# Patient Record
Sex: MALE | Race: AMERICAN INDIAN OR ALASKA NATIVE
[De-identification: names, ages, dates, MRNs, and addresses within clinical notes are randomized per-mention and may not be internally consistent; named-entity substitution may affect disease eponyms.]

---

## 2021-03-23 ENCOUNTER — HOSPITAL ENCOUNTER (INPATIENT)
Dept: HOSPITAL 5 - ED | Age: 51
LOS: 8 days | Discharge: NURSING, REHAB OR LONG-TERM CARE | DRG: 71 | End: 2021-03-31
Attending: INTERNAL MEDICINE | Admitting: INTERNAL MEDICINE
Payer: COMMERCIAL

## 2021-03-23 DIAGNOSIS — G93.41: Primary | ICD-10-CM

## 2021-03-23 DIAGNOSIS — Z79.82: ICD-10-CM

## 2021-03-23 DIAGNOSIS — I69.951: ICD-10-CM

## 2021-03-23 DIAGNOSIS — Z87.898: ICD-10-CM

## 2021-03-23 DIAGNOSIS — Z20.822: ICD-10-CM

## 2021-03-23 DIAGNOSIS — G40.909: ICD-10-CM

## 2021-03-23 DIAGNOSIS — Z79.01: ICD-10-CM

## 2021-03-23 LAB
ALBUMIN SERPL-MCNC: 4.5 G/DL (ref 3.9–5)
ALT SERPL-CCNC: 14 UNITS/L (ref 7–56)
APTT BLD: 24.7 SEC. (ref 24.2–36.6)
BAND NEUTROPHILS # (MANUAL): 0 K/MM3
BUN SERPL-MCNC: 21 MG/DL (ref 9–20)
BUN/CREAT SERPL: 23 %
CALCIUM SERPL-MCNC: 9.3 MG/DL (ref 8.4–10.2)
HCT VFR BLD CALC: 40.1 % (ref 35.5–45.6)
HEMOLYSIS INDEX: 5
HGB BLD-MCNC: 13.8 GM/DL (ref 11.8–15.2)
INR PPP: 1 (ref 0.87–1.13)
MCHC RBC AUTO-ENTMCNC: 34 % (ref 32–34)
MCV RBC AUTO: 98 FL (ref 84–94)
MYELOCYTES # (MANUAL): 0 K/MM3
PLATELET # BLD: 131 K/MM3 (ref 140–440)
PROMYELOCYTES # (MANUAL): 0 K/MM3
RBC # BLD AUTO: 4.11 M/MM3 (ref 3.65–5.03)
TOTAL CELLS COUNTED BLD: 100

## 2021-03-23 PROCEDURE — G0480 DRUG TEST DEF 1-7 CLASSES: HCPCS

## 2021-03-23 PROCEDURE — 85007 BL SMEAR W/DIFF WBC COUNT: CPT

## 2021-03-23 PROCEDURE — 70553 MRI BRAIN STEM W/O & W/DYE: CPT

## 2021-03-23 PROCEDURE — 80053 COMPREHEN METABOLIC PANEL: CPT

## 2021-03-23 PROCEDURE — 84484 ASSAY OF TROPONIN QUANT: CPT

## 2021-03-23 PROCEDURE — 80061 LIPID PANEL: CPT

## 2021-03-23 PROCEDURE — G0378 HOSPITAL OBSERVATION PER HR: HCPCS

## 2021-03-23 PROCEDURE — 85025 COMPLETE CBC W/AUTO DIFF WBC: CPT

## 2021-03-23 PROCEDURE — 80048 BASIC METABOLIC PNL TOTAL CA: CPT

## 2021-03-23 PROCEDURE — 70450 CT HEAD/BRAIN W/O DYE: CPT

## 2021-03-23 PROCEDURE — 85730 THROMBOPLASTIN TIME PARTIAL: CPT

## 2021-03-23 PROCEDURE — 80320 DRUG SCREEN QUANTALCOHOLS: CPT

## 2021-03-23 PROCEDURE — 96375 TX/PRO/DX INJ NEW DRUG ADDON: CPT

## 2021-03-23 PROCEDURE — 85610 PROTHROMBIN TIME: CPT

## 2021-03-23 PROCEDURE — 83735 ASSAY OF MAGNESIUM: CPT

## 2021-03-23 PROCEDURE — 85670 THROMBIN TIME PLASMA: CPT

## 2021-03-23 PROCEDURE — A9575 INJ GADOTERATE MEGLUMI 0.1ML: HCPCS

## 2021-03-23 PROCEDURE — 82550 ASSAY OF CK (CPK): CPT

## 2021-03-23 PROCEDURE — 82553 CREATINE MB FRACTION: CPT

## 2021-03-23 PROCEDURE — 70496 CT ANGIOGRAPHY HEAD: CPT

## 2021-03-23 PROCEDURE — 70498 CT ANGIOGRAPHY NECK: CPT

## 2021-03-23 PROCEDURE — 81001 URINALYSIS AUTO W/SCOPE: CPT

## 2021-03-23 PROCEDURE — 80307 DRUG TEST PRSMV CHEM ANLYZR: CPT

## 2021-03-23 PROCEDURE — 71045 X-RAY EXAM CHEST 1 VIEW: CPT

## 2021-03-23 PROCEDURE — 82962 GLUCOSE BLOOD TEST: CPT

## 2021-03-23 PROCEDURE — 96374 THER/PROPH/DIAG INJ IV PUSH: CPT

## 2021-03-23 PROCEDURE — U0003 INFECTIOUS AGENT DETECTION BY NUCLEIC ACID (DNA OR RNA); SEVERE ACUTE RESPIRATORY SYNDROME CORONAVIRUS 2 (SARS-COV-2) (CORONAVIRUS DISEASE [COVID-19]), AMPLIFIED PROBE TECHNIQUE, MAKING USE OF HIGH THROUGHPUT TECHNOLOGIES AS DESCRIBED BY CMS-2020-01-R: HCPCS

## 2021-03-23 PROCEDURE — 36415 COLL VENOUS BLD VENIPUNCTURE: CPT

## 2021-03-23 PROCEDURE — 93005 ELECTROCARDIOGRAM TRACING: CPT

## 2021-03-23 PROCEDURE — 93306 TTE W/DOPPLER COMPLETE: CPT

## 2021-03-23 NOTE — CAT SCAN REPORT
CT HEAD WITHOUT CONTRAST



INDICATION / CLINICAL INFORMATION:

Stroke symptoms.



TECHNIQUE:

All CT scans at this location are performed using CT dose reduction for ALARA by means of automated e
xposure control. 



COMPARISON:

None available.



FINDINGS:

HEMORRHAGE: None.

EXTRA-AXIAL SPACES: Normal in size and morphology for the patient's age.

VENTRICULAR SYSTEM: Normal in size and morphology for the patient's age.

CEREBRAL PARENCHYMA: No significant abnormality. No acute territorial infarct. 

MIDLINE SHIFT OR HERNIATION: None.

CEREBELLUM / BRAINSTEM: No significant abnormality.



ORBITS: Normal as visualized.

SOFT TISSUES of HEAD: No significant abnormality.

CALVARIUM: No significant abnormality.

PARANASAL SINUSES / MASTOID AIR CELLS: Polypoid mucosal thickening noted of the left maxillary sinus.




ADDITIONAL FINDINGS: None.



IMPRESSION:

1. No acute intracranial abnormality.



============================================

CODE STROKE:

Time of Communication (CST/CDT): 2145

Licensed Practitioner Receiving Report: Dr. Lehman ([facility]) 







============================================











Signer Name: Tristen Sierra MD 

Signed: 3/23/2021 10:46 PM

Workstation Name: [id number]

## 2021-03-23 NOTE — EMERGENCY DEPARTMENT REPORT
ED General Adult HPI





- General


Chief complaint: Altered Mental Status


Stated complaint: patient is confused and does not articulate a complaint


PUI?: No


Time Seen by Provider: 21 22:14


Source: patient, RN/MD, EMS ( EMS documentation not available at time of chart 

dictation ), RN notes reviewed


Mode of arrival: Stretcher


Limitations: Altered Mental Status, Physical Limitation





- History of Present Illness


Initial comments: 





The patient was evaluated in the emergency department for symptoms described in 

the history of present illness.  He/she was evaluated in the context of the 

global COVID-19 pandemic, which necessitated consideration that the patient 

might be at risk for infection with the virus that causes COVID-19.  

Institutional protocols and algorithms that pertain to the evaluation of 

patients at risk for COVID-19 are in a state of rapid change based on 

information released by regulatory bodies including the CDC and federal and 

state organizations.  These policies and algorithms were followed during the viri clarke's care in the emergency department.  Please note that these policies, 

procedures and recommendations changed on a rapid basis.





The patient is a 50-year-old gentleman.  He is not known to myself previously.  

He may have a history of stroke and/or seizure.  It is unclear what his prior 

diagnoses are.  The patient is brought to the hospital by emergency medical 

services.  He is awake, follows commands, but confused.  He only responds "yes."





Nursing team informs me that the patient is brought to the hospital for shaking 

activity, confusion, change in speech pattern, and right arm shaking.  His last 

known well time is not known.





It is not known who contacted 911.





The listed phone number goes right to voicemail, and patient not currently 

accompanied by friends, family, loved one for additional information/collateral 

information.  The patient himself is currently awake, but confused, and is not 

able to describe the qualitative nature of his symptoms, exacerbating factors, 

relieving factors, aggravating factors, and simply answers "yes."





EMS documentation not available at this time for my review.


-: unknown


Quality: other


Consistency: other


Improves with: other


Worsens with: other


Associated Symptoms: other


Treatments Prior to Arrival: other





- Related Data


                                Home Medications











 Medication  Instructions  Recorded  Confirmed  Last Taken


 


Aspirin [Adult Aspirin] 81 mg PO QDAY 21 Unknown


 


Atorvastatin Calcium [Lipitor] 80 mg PO QDAY 21 Unknown


 


Escitalopram [Lexapro] 10 mg PO DAILY 21 Unknown


 


Lisinopril/Hydrochlorothiazide 1 each PO QDAY 21 Unknown





[Zestoretic 10-12.5 mg Tablet]    


 


levETIRAcetam [Keppra TAB] 750 mg PO BID 21 Unknown











                                    Allergies











Allergy/AdvReac Type Severity Reaction Status Date / Time


 


No Known Allergies Allergy   Unverified 21 00:16














ED Review of Systems


ROS: 


Stated complaint: SEIZURES


Other details as noted in HPI





Comment: Unobtainable due to pts medical conditions





ED Past Medical Hx





- Medications


Home Medications: 


                                Home Medications











 Medication  Instructions  Recorded  Confirmed  Last Taken  Type


 


Aspirin [Adult Aspirin] 81 mg PO QDAY 21 Unknown History


 


Atorvastatin Calcium [Lipitor] 80 mg PO QDAY 21 Unknown History


 


Escitalopram [Lexapro] 10 mg PO DAILY 21 Unknown History


 


Lisinopril/Hydrochlorothiazide 1 each PO QDAY 21 Unknown History





[Zestoretic 10-12.5 mg Tablet]     


 


levETIRAcetam [Keppra TAB] 750 mg PO BID 21 Unknown History














ED Physical Exam





- General


Limitations: Altered Mental Status, Physical Limitation


General appearance: anxious, obese





- Head


Head exam: Present: atraumatic, normocephalic





- Eye


Eye exam: Present: normal appearance, PERRL, EOMI





- ENT


ENT exam: Present: normal exam, normal orophraynx, mucous membranes moist, 

normal external ear exam





- Neck


Neck exam: Present: normal inspection, full ROM.  Absent: tenderness, menin

gismus





- Respiratory


Respiratory exam: Present: normal lung sounds bilaterally.  Absent: respiratory 

distress, wheezes, rales, rhonchi, stridor, decreased breath sounds





- Cardiovascular


Cardiovascular Exam: Present: regular rate, normal rhythm, normal heart sounds. 

Absent: bradycardia, tachycardia, irregular rhythm, systolic murmur, diastolic 

murmur, rubs, gallop





- GI/Abdominal


GI/Abdominal exam: Present: soft.  Absent: distended, tenderness, guarding, 

rebound, rigid, pulsatile mass





- Rectal


Rectal exam: Present: deferred





- Extremities Exam


Extremities exam: Present: normal inspection, full ROM, pedal edema (1+ edema in

the bilateral lower extremities), other (2+ pulses noted in the bilateral upper 

and lower extremities.  There is no palpable cord.   negative Homans sign.  

Muscular compartments are soft.  The pelvis is stable.).  Absent: calf 

tenderness





- Back Exam


Back exam: Present: normal inspection, full ROM.  Absent: tenderness, CVA 

tenderness (R), CVA tenderness (L), paraspinal tenderness, vertebral tenderness





- Neurological Exam


Neurological exam: Present: altered, motor sensory deficit (There is 4 out of 5 

strength right leg.  There is 5 out of 5 strength right arm.  There is 5 out of 

5 strength left arm and left leg.  Sensation is intact to pinch in 4 

extremities.), other (There is no facial droop.  The tongue is midline.  

Extraocular movements are intact bilaterally.)





- Psychiatric


Psychiatric exam: Present: normal affect, normal mood





- Skin


Skin exam: Present: warm, dry, intact, normal color.  Absent: rash





ED Course


                                   Vital Signs











  21





  22:15 22:20 22:30


 


Temperature  98.4 F 


 


Pulse Rate 64 62 68


 


Respiratory  16 





Rate   


 


Blood Pressure 127/83  132/81


 


Blood Pressure  127/63 





[Left]   


 


O2 Sat by Pulse 100 100 100





Oximetry   


 


O2 Sat by Pulse   





Oximetry [   





Digit-Finger]   














  21





  22:45 23:01 23:32


 


Temperature   


 


Pulse Rate 86 57 L 68


 


Respiratory   





Rate   


 


Blood Pressure 125/89 125/89 125/89


 


Blood Pressure   





[Left]   


 


O2 Sat by Pulse 100 98 100





Oximetry   


 


O2 Sat by Pulse   





Oximetry [   





Digit-Finger]   














  21





  23:45 00:01 00:09


 


Temperature   


 


Pulse Rate 63 58 L 


 


Respiratory  16 





Rate   


 


Blood Pressure 136/78 137/78 


 


Blood Pressure   





[Left]   


 


O2 Sat by Pulse 99 99 





Oximetry   


 


O2 Sat by Pulse   99





Oximetry [   





Digit-Finger]   














- Reevaluation(s)


Reevaluation #1: 





21 22:32


Differential diagnosis, including but not limited to: Stroke, seizure, 

pneumonia, urinary tract infection, dedication noncompliance, electrolyte 

derangement, drug abuse





Assessment and plan: 50-year-old gentleman with seizure versus stroke.  After my

initial assessment, I was able to access the patient's EMS records, and also 

speak to his wife.





As per EMS documentation, patient's family called 911 because the patient was 

shaking and may have had a seizure.  As per EMS documentation, patient was 

complaining of tingling and pain to his right hand.  EMS documents that patient 

had a stroke 2021.  They also indicate that since the stroke, the 

patient has had a right-sided deficit with weakness and trouble speaking.





EMS documents normal vital signs in the field, and that the patient is ANO x2.





The patient's wife is also currently at the bedside.  She believes his last 

known well time is 9:00 PM.  She also corroborates the aforementioned.  She also

states the patient was admitted to [facility] last month, and believes that 

he was diagnosed with seizure versus stroke or both.  She endorses that the 

patient is taking his Keppra, denies fever, nausea, vomiting, diarrhea, urinary 

symptoms, and recreational drug use.  He only smokes black in miles as per his 

wife.





I had extensive discussion with the patient's wife regarding risks and benefits 

of TPA.  We specifically discussed the risks of hemorrhagic conversion.





At the moment, she is not interested in TPA.  In addition, given her 

endorsements and EMS documentation of stroke last month at another hospital, TPA

is relatively contraindicated.





We will obtain CT scan of the brain, CT angiogram head and neck, seek 

alternative causes for patient's convulsion, start Keppra therapy, and reassess 

after initial data points.





Accu-Chek in the ER is currently 99.


21 22:50


Noncontrast CT scan of the brain negative for acute findings.  Patient more 

awake.  Alert to name, able to identify his wife.  As per my discussion with 

neurology Dr. Henning, we both agree this is unlikely to be an ischemic stroke, 

much more likely to be a seizure, and as per recent history of ischemic strokes 

last month as per family, and EMS documentation, TPA relatively contraindicated.





I also had an extensive discussion with the patient and his wife regarding 

risks, benefits of TPA, and neither of them are interested in this medication at

this time, which I agree with and think is reasonable.


Reevaluation #2: 





21 00:09


CT angiogram neck negative.  Laboratory studies unremarkable.  Patient resting 

comfortably in stretcher, and in no acute distress.


21 00:30


ct angio head negative





Dr ALESHA Medrano to admit 





patient informed and updated





hes agreeable to the plan of care





No further seizure/convulsive events noted.








called wife, no answer, left voice mail for call back








21 00:33








- Pulse Oximetry Interpretation


  ** Digit-Finger


Initial Pulse Oximetry Readin


O2 Sat by Pulse Oximetry: 99


Actions Taken: none





ED Medical Decision Making





- Lab Data


Result diagrams: 


                                 21 22:39





                                 21 22:39








                                   Vital Signs











  21





  22:37


 


O2 Sat by Pulse 99





Oximetry [ 





Digit-Finger] 











                                   Vital Signs











  21





  22:54


 


O2 Sat by Pulse 99





Oximetry [ 





Digit-Finger] 











                                   Lab Results











  21 Range/Units





  22:18 22:39 22:39 


 


WBC   8.5   (4.5-11.0)  K/mm3


 


RBC   4.11   (3.65-5.03)  M/mm3


 


Hgb   13.8   (11.8-15.2)  gm/dl


 


Hct   40.1   (35.5-45.6)  %


 


MCV   98 H   (84-94)  fl


 


MCH   34 H   (28-32)  pg


 


MCHC   34   (32-34)  %


 


RDW   13.0 L   (13.2-15.2)  %


 


Plt Count   131 L   (140-440)  K/mm3


 


Baso % (Auto)   Np   


 


PT    13.0  (12.2-14.9)  Sec.


 


INR    1.00  (0.87-1.13)  


 


APTT    24.7  (24.2-36.6)  Sec.


 


Thrombin Time    16.6  (15.1-19.6)  Sec.


 


Sodium     (137-145)  mmol/L


 


Potassium     (3.6-5.0)  mmol/L


 


Chloride     ()  mmol/L


 


Carbon Dioxide     (22-30)  mmol/L


 


Anion Gap     mmol/L


 


BUN     (9-20)  mg/dL


 


Creatinine     (0.8-1.3)  mg/dL


 


Estimated GFR     ml/min


 


BUN/Creatinine Ratio     %


 


Glucose     ()  mg/dL


 


POC Glucose  99    ()  mg/dL


 


Calcium     (8.4-10.2)  mg/dL


 


Magnesium     (1.7-2.3)  mg/dL


 


Total Bilirubin     (0.1-1.2)  mg/dL


 


AST     (5-40)  units/L


 


ALT     (7-56)  units/L


 


Alkaline Phosphatase     ()  units/L


 


Total Creatine Kinase     ()  units/L


 


CK-MB (CK-2)     (0.0-4.0)  ng/mL


 


CK-MB (CK-2) Rel Index     (0-4)  


 


Troponin T     (0.00-0.029)  ng/mL


 


Total Protein     (6.3-8.2)  g/dL


 


Albumin     (3.9-5)  g/dL


 


Albumin/Globulin Ratio     %


 


Plasma/Serum Alcohol     (0-0.07)  %














  21 Range/Units





  22:39 22:39 


 


WBC    (4.5-11.0)  K/mm3


 


RBC    (3.65-5.03)  M/mm3


 


Hgb    (11.8-15.2)  gm/dl


 


Hct    (35.5-45.6)  %


 


MCV    (84-94)  fl


 


MCH    (28-32)  pg


 


MCHC    (32-34)  %


 


RDW    (13.2-15.2)  %


 


Plt Count    (140-440)  K/mm3


 


Baso % (Auto)    


 


PT    (12.2-14.9)  Sec.


 


INR    (0.87-1.13)  


 


APTT    (24.2-36.6)  Sec.


 


Thrombin Time    (15.1-19.6)  Sec.


 


Sodium  136 L   (137-145)  mmol/L


 


Potassium  3.7   (3.6-5.0)  mmol/L


 


Chloride  100.1   ()  mmol/L


 


Carbon Dioxide  28   (22-30)  mmol/L


 


Anion Gap  12   mmol/L


 


BUN  21 H   (9-20)  mg/dL


 


Creatinine  0.9   (0.8-1.3)  mg/dL


 


Estimated GFR  > 60   ml/min


 


BUN/Creatinine Ratio  23   %


 


Glucose  108 H   ()  mg/dL


 


POC Glucose    ()  mg/dL


 


Calcium  9.3   (8.4-10.2)  mg/dL


 


Magnesium  2.00   (1.7-2.3)  mg/dL


 


Total Bilirubin  1.00   (0.1-1.2)  mg/dL


 


AST  24   (5-40)  units/L


 


ALT  14   (7-56)  units/L


 


Alkaline Phosphatase  74   ()  units/L


 


Total Creatine Kinase  68   ()  units/L


 


CK-MB (CK-2)  < 1.0   (0.0-4.0)  ng/mL


 


CK-MB (CK-2) Rel Index  1.4   (0-4)  


 


Troponin T  < 0.010   (0.00-0.029)  ng/mL


 


Total Protein  6.8   (6.3-8.2)  g/dL


 


Albumin  4.5   (3.9-5)  g/dL


 


Albumin/Globulin Ratio  2.0   %


 


Plasma/Serum Alcohol   < 0.01  (0-0.07)  %














- EKG Data


-: EKG Interpreted by Me


EKG shows normal: sinus rhythm


Rate: normal





- EKG Data


When compared to previous EKG there are: previous EKG unavailable





21 22:39


Time of interpretation, 10: 40 1 PM





Sinus rhythm, 61 bpm.  Right axis deviation.  Left posterior fascicular block.  

Incomplete right bundle branch block.  QTC within normal limits.  Left 

ventricular hypertrophy.  Abnormal EKG.  Not a STEMI.  No prior for comparison.





- Radiology Data


Radiology results: pending, report reviewed, image reviewed





CT HEAD WITHOUT CONTRAST  INDICATION / CLINICAL INFORMATION: Stroke symptoms.  

TECHNIQUE: All CT scans at this location are performed using CT dose reduction 

for ALARA by means of automated exposure control.  COMPARISON: None available.  

FINDINGS: HEMORRHAGE: None. EXTRA-AXIAL SPACES: Normal in size and morphology 

for the patient's age. VENTRICULAR SYSTEM: Normal in size and morphology for the

patient's age. CEREBRAL PARENCHYMA: No significant abnormality. No acute 

territorial infarct. MIDLINE SHIFT OR HERNIATION: None. CEREBELLUM / BRAINSTEM: 

No significant abnormality.  ORBITS: Normal as visualized. SOFT TISSUES of HEAD:

No significant abnormality. CALVARIUM: No significant abnormality. PARANASAL 

SINUSES / MASTOID AIR CELLS: Polypoid mucosal thickening noted of the left 

maxillary sinus.  ADDITIONAL FINDINGS: None.  IMPRESSION: 1. No acute 

intracranial abnormality.  ============================================ CODE 

STROKE: Time of Communication (CST/CDT):  Licensed Practitioner Receiving 

Report: Dr. Lehman ([facility])    ============================================ 








X-ray the chest is negative for acute findings.


Critical care attestation.: 


If time is entered above; I have spent that time in minutes in the direct care 

of this critically ill patient, excluding procedure time.








ED Disposition


Clinical Impression: 


 History of seizure, History of stroke, Right sided weakness, Speech disturbance





Disposition:  OP ADMIT IP TO THIS HOSP


Is pt being admited?: Yes


Does the pt Need Aspirin: Yes


Condition: Good


Referrals: 


PRIMARY CARE,MD [Primary Care Provider] - 3-5 Days





- Assessment


Assessment Interval: Baseline





- Level of Consciousness


1a. Level of Consciousness: alert/keenly responsive





- LOC Questions


1b. LOC Questions: answers 1 question correctly





- LOC Command


1c. LOC Commands: performs tasks correctly





- Best Gaze


2. Best Gaze: normal





- Visual


3. Visual: no visual loss





- Facial Palsy


4. Facial Palsy: normal symmetrical movement





- Motor Arm


5a. Motor Arm Left: no drift


5b. Motor Arm Right: drift





- Motor Leg


6a. Motor Leg Left: no drift


6b. Motor Leg Right: drift





- Limb Ataxia


7. Limb Ataxia: absent





- Sensory


8. Sensory: normal





- Best Language


9. Best Language: mild/moderate aphasia





- Dysarthria


10. Dysarthria: normal





- Extinction and Inattention


11. Extinction/Inattention: no abnormality





- Scoring


Total Score: 4


Stroke Severity: Minor Stroke

## 2021-03-23 NOTE — EMERGENCY DEPARTMENT REPORT
Blank Doc





- Documentation


Documentation: 





[facility] Consult Note





# Demographics


Consult Type: 0-6 hour Stroke


First Name: Ned


Last Name: Shawn


YOB: 1970


Age: 50


Gender: male


Time of initial page (Mountain Time): 03-, 20:22


Time of return call (Mountain Time): 03-, 20:22





# HPI


Additional History: pt presents from home, was complaining of right arm 

tingling, some shaking. not responding for 3 minutes. reported prior stroke. 

some difficulty speaking which is





2100 last well, aphasia and right hand numbness and tremors. was evaluated 

recently for stroke vs seizure. on keppra after his last presentation


Context/Pre-existing conditions: pre-existing speech problem





# Scores


Time of exam and NIHSS (Mountain Time): 03-, 20:32


Level of Consciousness 1a: [0] = Alert; keenly responsive


LOC Questions 1b: [2] = Answers neither correctly


LOC Commands 1c: [0] = Performs both tasks correctly


Best Gaze 2: [0] = Normal


Visual 3: [0] = No visual loss


Facial Palsy 4: [0] = Normal symmetrical movements


Motor Arm Left 5a: [0] = No drift


Motor Arm Right 5b: [0] = No drift


Motor Leg Left 6a: [0] = No drift


Motor Leg Right 6b: [0] = No drift


Limb Ataxia 7: [0] = Absent


Sensory 8: [0] = Normal


Best Language 9: [1] = Mild-to-moderate aphasia


Dysarthria 10: [0] = Normal


Extinction and Inattention 11: [0] = No abnormality


NIHSS Total: 3





# PMH-FH-SH


Past Medical History: stroke





# Data


Time Head CT personally ready by me (Mountain Time): 03-, 20:26


Head CT: preliminarily reviewed by me, please refer to radiology read for 

official reading, no bleed





# Assessment


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 given recurrent nature 

stroke much less likely





# Plan


Thrombolytic/Intervention: NOT IV Alteplase or IA Intervention


Alteplase Exclusion (<3 hour window): stroke within 3 months, other (see below)


Alteplase Exclusion: unclear episode last month, suspecting cause other than 

stroke for recurrent episode


Intraarterial Exclusion: clinically not consistent with stroke


Imaging: (urgency: STAT in ED): CT Angiogram Head and CT Angiogram Neck AND call

 back with results if abnormal


Imaging: (urgency: routine admission): MRI Brain with AND without contrast


Diagnostic Test: EEG


Therapy/Evaluation: NPO until swallow evaluation, PT/OT evaluation, 

speech/swallow consultation


Medication: levetiracetam (Keppra) 1000 mg twice daily


Other: seizure precautions, I have discussed my recommendations with the 

referring provider


Disposition: admit





# Logistics


Telemedicine: Interactive 2 way audio and visual telecommunication technology 

was utilized during this visit

## 2021-03-23 NOTE — XRAY REPORT
CHEST 1 VIEW 3/23/2021 10:13 PM



INDICATION / CLINICAL INFORMATION:

sz vs cva.



COMPARISON: 

None available.



FINDINGS:



SUPPORT DEVICES: None.



HEART / MEDIASTINUM: No significant abnormality. 



LUNGS / PLEURA: No significant pulmonary or pleural abnormality. No pneumothorax. 



ADDITIONAL FINDINGS: No significant additional findings.



IMPRESSION:

1. No acute findings.



Signer Name: Cliff Wiggins MD 

Signed: 3/23/2021 11:16 PM

Workstation Name: [id number]

## 2021-03-24 LAB
BENZODIAZEPINES SCREEN,URINE: (no result)
BILIRUB UR QL STRIP: (no result)
BLOOD UR QL VISUAL: (no result)
METHADONE SCREEN,URINE: (no result)
MUCOUS THREADS #/AREA URNS HPF: (no result) /HPF
OPIATE SCREEN,URINE: (no result)
PH UR STRIP: 6 [PH] (ref 5–7)
PROT UR STRIP-MCNC: (no result) MG/DL
RBC #/AREA URNS HPF: 1 /HPF (ref 0–6)
UROBILINOGEN UR-MCNC: < 2 MG/DL (ref ?–2)
WBC #/AREA URNS HPF: < 1 /HPF (ref 0–6)

## 2021-03-24 RX ADMIN — LEVETIRACETAM SCH MLS/HR: 100 INJECTION, SOLUTION INTRAVENOUS at 21:29

## 2021-03-24 RX ADMIN — ASPIRIN SCH: 325 TABLET ORAL at 09:18

## 2021-03-24 RX ADMIN — LEVETIRACETAM SCH MLS/HR: 100 INJECTION, SOLUTION INTRAVENOUS at 09:13

## 2021-03-24 NOTE — CAT SCAN REPORT
CTA NECK WITH CONTRAST



HISTORY: Right-sided weakness



COMPARISON: None.



TECHNIQUE: Routine CTA of the neck was performed. 3-D/MIP reformats were postprocessed.  Percentage s
tenosis is determined by direct quantitative measurements of diseased internal carotid artery diamete
r compared with normal distal internal carotid artery reference segments or by criteria similar to NA
SCET where applicable.All CT scans at this location are performed using CT dose reduction for ALARA b
y means of automated exposure control



CONTRAST: 100 ml of Omnipaque 350



FINDINGS:



Aortic arch: No significant abnormality.



Cervical vertebral arteries: No significant abnormality.



Common carotid arteries: No significant abnormality.



Carotid bifurcations: Normal bilaterally



Cervical internal carotid arteries: No significant abnormality.



Additional findings: None. 



IMPRESSION:

1. No significant abnormality.



Signer Name: Noah Felder MD 

Signed: 3/24/2021 12:01 AM

Workstation Name: [id number]

## 2021-03-24 NOTE — CAT SCAN REPORT
CTA HEAD WITH CONTRAST



HISTORY: Slurred speech; right-sided weakness



COMPARISON: None.



TECHNIQUE: Routine non-contrast CT Head, CTA of the head and post-contrast CT Head are performed. 3-D
/MIP reformats postprocessed. All CT scans at this location are performed using CT dose reduction for
 ALARA by means of automated exposure control



CONTRAST: 100 ml of Omnipaque 350



FINDINGS: 



CTA Head:

Intracranial vertebral arteries: No significant abnormality.

Basilar artery: No significant abnormality.

Posterior cerebral arteries: No significant abnormality.



Intracranial internal carotid arteries: No significant abnormality.

Anterior cerebral arteries: No significant abnormality.

Middle cerebral arteries: No significant abnormality.



Dural venous sinuses:Not optimally opacified. No significant abnormality.



Additional findings: None. 



IMPRESSION:

1. No significant abnormality.



Signer Name: Noah Felder MD 

Signed: 3/24/2021 12:10 AM

Workstation Name: [id number]

## 2021-03-24 NOTE — CONSULTATION
History of Present Illness


Consult date: 03/24/21


Reason for Consult: AMS


Chief complaint: 





AMS


History of present illness: 





50 yo male with possible stroke and seizure d/o who presents with a possible 

event with noted right-sided tremors or shaking and with teleneurology 

evaluation in the ED with a noted NIHSS of 3. Patient is noted to be aphasic 

(expressive > receptive) during evaluation, so limited history is obtained at 

present.  Patient states 'yes" to a hx of seizures.  Noted with Keppra 750 bid 

on home medication list and aspirin 81 mg also on home medication list.





Past History


Past Medical History: seizures, stroke


Past Surgical History: No surgical history


Social history: no significant social history


Family history: no significant family history





Medications and Allergies


                                    Allergies











Allergy/AdvReac Type Severity Reaction Status Date / Time


 


No Known Allergies Allergy   Unverified 03/24/21 00:16











                                Home Medications











 Medication  Instructions  Recorded  Confirmed  Last Taken  Type


 


Aspirin [Adult Aspirin] 81 mg PO QDAY 03/24/21 03/24/21 Unknown History


 


Atorvastatin Calcium [Lipitor] 80 mg PO QDAY 03/24/21 03/24/21 Unknown History


 


Escitalopram [Lexapro] 10 mg PO DAILY 03/24/21 03/24/21 Unknown History


 


Lisinopril/Hydrochlorothiazide 1 each PO QDAY 03/24/21 03/24/21 Unknown History





[Zestoretic 10-12.5 mg Tablet]     


 


levETIRAcetam [Keppra TAB] 750 mg PO BID 03/24/21 03/24/21 Unknown History











Active Meds: 


Active Medications





Acetaminophen (Acetaminophen 325 Mg Tab)  650 mg PO Q4H PRN


   PRN Reason: Headache


Aspirin (Aspirin 325 Mg Tab)  325 mg PO QDAY [facility]


   Last Admin: 03/24/21 09:18 Dose:  Not Given


   Documented by: 


Levetiracetam 750 mg/ Dextrose  107.5 mls @ 400 mls/hr IV Q12HR [facility]


   Last Admin: 03/24/21 09:13 Dose:  400 mls/hr


   Documented by: 


Lorazepam (Lorazepam 2 Mg/Ml Vial)  1 mg IV Q1H PRN


   PRN Reason: Seizures


Ondansetron HCl (Ondansetron 4 Mg/2 Ml Inj)  4 mg IV Q8H PRN


   PRN Reason: Nausea And Vomiting











Review of Systems


All systems: negative (as per HPI but limited by aphasia;)





Physical Examination





- Vital Signs


Vital Signs: 


                                   Vital Signs











Pulse BP Pulse Ox


 


 64   127/83   100 


 


 03/23/21 22:15  03/23/21 22:15  03/23/21 22:15














- Physical Exam


Narrative exam: 





Gen: nad, well-nourished;


Head: normocephalic;


Eyes: no gaze deviation; no ptosis;


ENT:  normal vocalization;


CVS: warm and well-perfused;


Pulm: no respiratory distress;


GI: appears non-distended;


Ext: no cyanosis at distal extremities;


Skin: no acute rash at distal extremities;


Heme: no pathologic bruising or ecchymosis at distal extremities;


Neuro: alert, oriented to age only, not name, month, year; +perseveration; 

slight dysarthria, expressive>receptive aphasia, CN 2 - PERRL, visual fields 

grossly intact but limited by aphasia, CN 3, 4, 6 - EOMI, CN 5 - facial 

sensation symmetric to light touch, CN 7 - facial movement symmetric except mild

right orolabial droop, CN 8 - hearing grossly intact, CN 9, 10 - uvula midline, 

CN 11 - shrug appears symmetric, CN 12 - tongue midline; Motor - at least 4/5 in

all exts; Sensory - light touch symmetric, Cerebellar - fnf/hts difficulty 

secondary to aphasia; noted right arm tremors, Gait - deferred secondary to fall

risk;





NIHSS


(1a.) Level of Consciousness:0


(1b.) LOC Questions:1


(1c.) LOC Commands:1


(2.)   Best Gaze:0


(3.)   Visual:0


(4.)   Facial Palsy:1


(5a.) Motor Arm, Left:0


(5b.) Motor Arm, Right:0


(6a.) Motor Leg, Left:0


(6b.) Motor Leg, Right:0


(7.)   Limb Ataxia:0


(8.)   Sensory:0


(9.)   Best Language:2


(10.) Dysarthria:1


(11.) Extinction and Inattention:0


NIHSS Total Score: 6








Results





- Laboratory Findings


CBC and BMP: 


                                 03/23/21 22:39





                                 03/23/21 22:39


Abnormal Lab Findings: 


                                  Abnormal Labs











  03/23/21 03/23/21 03/23/21





  22:39 22:39 Unknown


 


MCV  98 H  


 


MCH  34 H  


 


RDW  13.0 L  


 


Plt Count  131 L  


 


Monocytes % (Manual)  10.0 H  


 


Monocytes # (Manual)  0.9 H  


 


Sodium   136 L 


 


BUN   21 H 


 


Glucose   108 H 


 


POC Glucose   


 


Ur Specific Gravity    > 1.059 H














  03/24/21





  08:31


 


MCV 


 


MCH 


 


RDW 


 


Plt Count 


 


Monocytes % (Manual) 


 


Monocytes # (Manual) 


 


Sodium 


 


BUN 


 


Glucose 


 


POC Glucose  107 H


 


Ur Specific Gravity 














Assessment and Plan





50 yo male with stroke and seizure d/o who presents with encephalopathy.  

Patient's baseline is unclear to me.





1.  Seizure - increase Keppra to 1000 mg po bid; EEG pending; MRI Brain w/ wo 

contrast.





2.  Acute Ischemic Stroke - aspirin 325 mg po qday, staitn therapy for a goal 

LDL of 70; MRI Brain w/ wo contrast and if positive for an acute/subacute 

infarction, then recommend a TTEcho; confirm LDL/TSH, telemetry, SBP goal 160-

200 mmHg and DBP  mmHg for 24 more hours.  PT/OT/ST/Swallow evaluation. 

Long-term risk-factor modification, including a strict diet/exercise regimen for

secondary stroke prophylaxis.





3.  Metabolic Encephalopathy - per primary team, if patient is not at his 

basline.





Bruno Link MD


Neurology

## 2021-03-24 NOTE — HISTORY AND PHYSICAL REPORT
History of Present Illness


Date of examination: 03/24/21


Date of admission: 


03/24/21 00:32





Chief complaint: 





Altered mental status


History of present illness: 





History of presenting illness, patient is a 51-year-old male with past medical 

history of seizure disorder, cerebrovascular accident with right-sided weakness 

and speech impairment presenting with altered mental status.  Patient is 

noncommunicative and was alone during evaluation with no body history gave a

ccount of what happened.  History is based on information from the emergency 

room and patient's medical record. There is worsening speech impairment , 

generalized weakness, and shaking sensation of the body at home prior to 

presentation. 





Past History


Past Medical History: seizures, stroke


Past Surgical History: No surgical history


Social history: no significant social history


Family history: no significant family history





Medications and Allergies


                                    Allergies











Allergy/AdvReac Type Severity Reaction Status Date / Time


 


No Known Allergies Allergy   Unverified 03/24/21 00:16











                                Home Medications











 Medication  Instructions  Recorded  Confirmed  Last Taken  Type


 


Aspirin [Adult Aspirin] 81 mg PO QDAY 03/24/21 03/24/21 Unknown History


 


Atorvastatin Calcium [Lipitor] 80 mg PO QDAY 03/24/21 03/24/21 Unknown History


 


Escitalopram [Lexapro] 10 mg PO DAILY 03/24/21 03/24/21 Unknown History


 


Lisinopril/Hydrochlorothiazide 1 each PO QDAY 03/24/21 03/24/21 Unknown History





[Zestoretic 10-12.5 mg Tablet]     


 


levETIRAcetam [Keppra TAB] 750 mg PO BID 03/24/21 03/24/21 Unknown History











Active Meds: 


Active Medications





Acetaminophen (Acetaminophen 325 Mg Tab)  650 mg PO Q4H PRN


   PRN Reason: Headache


Aspirin (Aspirin 325 Mg Tab)  325 mg PO QDAY [facility]


Ondansetron HCl (Ondansetron 4 Mg/2 Ml Inj)  4 mg IV Q8H PRN


   PRN Reason: Nausea And Vomiting











Review of Systems


Constitutional: weakness, no fever, no chills


Eyes: bilateral: other (NO BILATERAL EYE SYMPTOM)


Ears, nose, mouth and throat: no ear pain


Cardiovascular: no chest pain, no palpitations, no rapid/irregular heart beat, 

no syncope, no lightheadedness, no shortness of breath


Respiratory: no cough


Gastrointestinal: no abdominal pain, no nausea, no vomiting


Genitourinary Male: no dysuria


Rectal: no pain


Musculoskeletal: no neck stiffness, no neck pain


Integumentary: no rash, no pruritis, no redness, no sores


Neurological: weakness, seizures, no paralysis, no parathesias, no numbness, no 

tingling, no syncope, no tremors, no vertigo, no headaches


Psychiatric: no anxiety, no depression


Endocrine: no polydipsia, no polyuria, no nocturia, no palpatations


Hematologic/Lymphatic: no easy bruising, no easy bleeding





Exam





- Constitutional


Vitals: 


                                        











Temp Pulse Resp BP Pulse Ox


 


 98.4 F   67   14   139/82   99 


 


 03/23/21 22:20  03/24/21 01:01  03/24/21 00:45  03/24/21 01:01  03/24/21 01:01











General appearance: Present: no acute distress





- EENT


Eyes: Present: PERRL, EOM intact


ENT: hearing intact





- Neck


Neck: Present: supple, normal ROM





- Respiratory


Respiratory effort: normal





- Cardiovascular


Rhythm: regular


Heart Sounds: Present: S1 & S2.  Absent: gallop, systolic murmur, diastolic 

murmur





- Extremities


Extremities: no ischemia, No edema


Peripheral Pulses: within normal limits





- Abdominal


General gastrointestinal: Present: soft, non-tender, non-distended.  Absent: 

tender, distended, rigid, hepatomegaly, splenomegaly


Male genitourinary: Present: deferred





- Rectal


Rectal Exam: deferred





- Integumentary


Integumentary: Present: clear, warm, dry





- Musculoskeletal


Musculoskeletal: generalized weakness





- Psychiatric


Psychiatric: appropriate mood/affect





- Neurologic


Neurologic: no moves all extremities





HEART Score





- HEART Score


Risk factors: 1-2 risk factors


Troponin: 


                                        











Troponin T  < 0.010 ng/mL (0.00-0.029)   03/23/21  22:39    











Troponin: < normal limit





- Critical Actions


Critical Actions: 0-3 pts:0.9-1.7%risk of adverse cardiac event.Candidate for 

discharge





Results





- Labs


CBC & Chem 7: 


                                 03/23/21 22:39





                                 03/23/21 22:39


Labs: 


                             Laboratory Last Values











WBC  8.5 K/mm3 (4.5-11.0)   03/23/21  22:39    


 


RBC  4.11 M/mm3 (3.65-5.03)   03/23/21  22:39    


 


Hgb  13.8 gm/dl (11.8-15.2)   03/23/21  22:39    


 


Hct  40.1 % (35.5-45.6)   03/23/21  22:39    


 


MCV  98 fl (84-94)  H  03/23/21  22:39    


 


MCH  34 pg (28-32)  H  03/23/21  22:39    


 


MCHC  34 % (32-34)   03/23/21  22:39    


 


RDW  13.0 % (13.2-15.2)  L  03/23/21  22:39    


 


Plt Count  131 K/mm3 (140-440)  L  03/23/21  22:39    


 


Baso % (Auto)  Np   03/23/21  22:39    


 


Add Manual Diff  Complete   03/23/21  22:39    


 


Total Counted  100   03/23/21  22:39    


 


Seg Neuts % (Manual)  68.0 % (40.0-70.0)   03/23/21  22:39    


 


Lymphocytes % (Manual)  20.0 % (13.4-35.0)   03/23/21  22:39    


 


Monocytes % (Manual)  10.0 % (0.0-7.3)  H  03/23/21  22:39    


 


Eosinophils % (Manual)  1.0 % (0.0-4.3)   03/23/21  22:39    


 


Basophils % (Manual)  1.0 % (0.0-1.8)   03/23/21  22:39    


 


Nucleated RBC %  Not Reportable   03/23/21  22:39    


 


Seg Neutrophils # Man  5.8 K/mm3 (1.8-7.7)   03/23/21  22:39    


 


Band Neutrophils #  0.0 K/mm3  03/23/21  22:39    


 


Lymphocytes # (Manual)  1.7 K/mm3 (1.2-5.4)   03/23/21  22:39    


 


Abs React Lymphs (Man)  0.0 K/mm3  03/23/21  22:39    


 


Monocytes # (Manual)  0.9 K/mm3 (0.0-0.8)  H  03/23/21  22:39    


 


Eosinophils # (Manual)  0.1 K/mm3 (0.0-0.4)   03/23/21  22:39    


 


Basophils # (Manual)  0.1 K/mm3 (0.0-0.1)   03/23/21  22:39    


 


Metamyelocytes #  0.0 K/mm3  03/23/21  22:39    


 


Myelocytes #  0.0 K/mm3  03/23/21  22:39    


 


Promyelocytes #  0.0 K/mm3  03/23/21  22:39    


 


Blast Cells #  0.0 K/mm3  03/23/21  22:39    


 


WBC Morphology  Not Reportable   03/23/21  22:39    


 


Hypersegmented Neuts  Not Reportable   03/23/21  22:39    


 


Hyposegmented Neuts  Not Reportable   03/23/21  22:39    


 


Hypogranular Neuts  Not Reportable   03/23/21  22:39    


 


Smudge Cells  Not Reportable   03/23/21  22:39    


 


Toxic Granulation  Not Reportable   03/23/21  22:39    


 


Toxic Vacuolation  Not Reportable   03/23/21  22:39    


 


Dohle Bodies  Not Reportable   03/23/21  22:39    


 


Pelger-Huet Anomaly  Not Reportable   03/23/21  22:39    


 


Kasia Rods  Not Reportable   03/23/21  22:39    


 


Platelet Estimate  Not Reportable   03/23/21  22:39    


 


Clumped Platelets  Not Reportable   03/23/21  22:39    


 


Plt Clumps, EDTA  Not Reportable   03/23/21  22:39    


 


Large Platelets  Not Reportable   03/23/21  22:39    


 


Giant Platelets  Not Reportable   03/23/21  22:39    


 


Platelet Satelliting  Not Reportable   03/23/21  22:39    


 


Plt Morphology Comment  Not Reportable   03/23/21  22:39    


 


RBC Morphology  Normal   03/23/21  22:39    


 


Dimorphic RBCs  Not Reportable   03/23/21  22:39    


 


Polychromasia  Not Reportable   03/23/21  22:39    


 


Hypochromasia  Not Reportable   03/23/21  22:39    


 


Poikilocytosis  Not Reportable   03/23/21  22:39    


 


Anisocytosis  Not Reportable   03/23/21  22:39    


 


Microcytosis  Not Reportable   03/23/21  22:39    


 


Macrocytosis  Not Reportable   03/23/21  22:39    


 


Spherocytes  Not Reportable   03/23/21  22:39    


 


Pappenheimer Bodies  Not Reportable   03/23/21  22:39    


 


Sickle Cells  Not Reportable   03/23/21  22:39    


 


Target Cells  Not Reportable   03/23/21  22:39    


 


Tear Drop Cells  Not Reportable   03/23/21  22:39    


 


Ovalocytes  Not Reportable   03/23/21  22:39    


 


Helmet Cells  Not Reportable   03/23/21  22:39    


 


Woods-[place] Bodies  Not Reportable   03/23/21  22:39    


 


Cabot Rings  Not Reportable   03/23/21  22:39    


 


Primitivo Cells  Not Reportable   03/23/21  22:39    


 


Bite Cells  Not Reportable   03/23/21  22:39    


 


Crenated Cell  Not Reportable   03/23/21  22:39    


 


Elliptocytes  Not Reportable   03/23/21  22:39    


 


Acanthocytes (Spur)  Not Reportable   03/23/21  22:39    


 


Rouleaux  Not Reportable   03/23/21  22:39    


 


Hemoglobin C Crystals  Not Reportable   03/23/21  22:39    


 


Schistocytes  Not Reportable   03/23/21  22:39    


 


Malaria parasites  Not Reportable   03/23/21  22:39    


 


True Bodies  Not Reportable   03/23/21  22:39    


 


Hem Pathologist Commnt  No   03/23/21  22:39    


 


PT  13.0 Sec. (12.2-14.9)   03/23/21  22:39    


 


INR  1.00  (0.87-1.13)   03/23/21  22:39    


 


APTT  24.7 Sec. (24.2-36.6)   03/23/21  22:39    


 


Thrombin Time  16.6 Sec. (15.1-19.6)   03/23/21  22:39    


 


Sodium  136 mmol/L (137-145)  L  03/23/21  22:39    


 


Potassium  3.7 mmol/L (3.6-5.0)   03/23/21  22:39    


 


Chloride  100.1 mmol/L ()   03/23/21  22:39    


 


Carbon Dioxide  28 mmol/L (22-30)   03/23/21  22:39    


 


Anion Gap  12 mmol/L  03/23/21  22:39    


 


BUN  21 mg/dL (9-20)  H  03/23/21  22:39    


 


Creatinine  0.9 mg/dL (0.8-1.3)   03/23/21  22:39    


 


Estimated GFR  > 60 ml/min  03/23/21  22:39    


 


BUN/Creatinine Ratio  23 %  03/23/21  22:39    


 


Glucose  108 mg/dL ()  H  03/23/21  22:39    


 


POC Glucose  99 mg/dL ()   03/23/21  22:18    


 


Calcium  9.3 mg/dL (8.4-10.2)   03/23/21  22:39    


 


Magnesium  2.00 mg/dL (1.7-2.3)   03/23/21  22:39    


 


Total Bilirubin  1.00 mg/dL (0.1-1.2)   03/23/21  22:39    


 


AST  24 units/L (5-40)   03/23/21  22:39    


 


ALT  14 units/L (7-56)   03/23/21  22:39    


 


Alkaline Phosphatase  74 units/L ()   03/23/21  22:39    


 


Total Creatine Kinase  68 units/L ()   03/23/21  22:39    


 


CK-MB (CK-2)  < 1.0 ng/mL (0.0-4.0)   03/23/21  22:39    


 


CK-MB (CK-2) Rel Index  1.4  (0-4)   03/23/21  22:39    


 


Troponin T  < 0.010 ng/mL (0.00-0.029)   03/23/21  22:39    


 


Total Protein  6.8 g/dL (6.3-8.2)   03/23/21  22:39    


 


Albumin  4.5 g/dL (3.9-5)   03/23/21  22:39    


 


Albumin/Globulin Ratio  2.0 %  03/23/21  22:39    


 


Plasma/Serum Alcohol  < 0.01 % (0-0.07)   03/23/21  22:39    














Assessment and Plan





- Patient Problems


(1) Altered mental status


Current Visit: Yes   Status: Acute   


Plan to address problem: 


1. NEUROLOGY CONSULT








(2) History of seizure


Current Visit: Yes   Status: Acute   


Plan to address problem: 


1. NEUROLOGY CONSULT


 2. SEIZURE PRECAUTIONS


 3. I.V ATIVAN PRN SEIZURE


 4. SEIZURE MEDICATION TO BE CONTINUED


 








(3) Right sided weakness


Current Visit: Yes   Status: Acute   


Plan to address problem: 


1. NEUROLOGY CONSULT


 2. PHYSICAL THERAPY








(4) Speech disturbance


Current Visit: Yes   Status: Acute   


Plan to address problem: 


1. NPO UNTIL SWALLOW TEST PASSED


 2. NEUROLOGY CONSULT


 3. SPEECH THERAPY CONSULT

## 2021-03-25 LAB — HDLC SERPL-MCNC: 48 MG/DL (ref 40–59)

## 2021-03-25 RX ADMIN — ASPIRIN SCH MG: 325 TABLET ORAL at 09:16

## 2021-03-25 NOTE — PROGRESS NOTE
Assessment and Plan


Assessment and plan: 





(1) Altered mental status


Current Visit: Yes   Status: Acute   


Plan to address problem: 


1. NEUROLOGY CONSULT








(2) History of seizure


Current Visit: Yes   Status: Acute   


Plan to address problem: 


1. NEUROLOGY CONSULT


 2. SEIZURE PRECAUTIONS


 3. I.V ATIVAN PRN SEIZURE


 4. SEIZURE MEDICATION TO BE CONTINUED


 








(3) Right sided weakness


Current Visit: Yes   Status: Acute   


Plan to address problem: 


1. NEUROLOGY CONSULT


 2. PHYSICAL THERAPY








(4) Speech disturbance


Current Visit: Yes   Status: Acute   


Plan to address problem: 


1. NPO UNTIL SWALLOW TEST PASSED


 2. NEUROLOGY CONSULT


 3. SPEECH THERAPY CONSULT





3/25/2021


-I have seen and evaluated the patient, patient said he is feeling okay.  

Patient was alert and communicative but slow to respond, that is his baseline 

based on his wife.  No seizure after admission.  Patient was evaluated by his 

therapy. 


Neurology consulted and recommend MRI with and without contrast, increase Keppra

to 1000mg BID but was not ordered.  EEG ordered


Increase the Keppra, waiting neurology if he still needs MRI.


Patient was evaluated by physical therapy and recommend acute rehab.





History


Interval history: 





Patient was seen and evaluated this morning


Patient did have seizure after admission


Patient was communicative but slow to speak, which is his baseline based on his 

wife





Hospitalist Physical





- Physical exam


Narrative exam: 





 Not in cardiopulmonary distress. 


 The patient appeared well nourished and normally developed.


 Vital signs as documented.


 Head exam is unremarkable.


 No scleral icterus .


 Neck is without jugular venous distension, thyromegaly, or carotid bruits. 


 Lungs are clear to auscultation.


Cardiac exam reveals regular rate and  Rhythm. 


Abdominal exam reveals normal bowel sounds, nontender, no organomegaly.


Extremities are nonedematous and both femoral and pedal pulses are normal.


CNS: Alert .  Right-sided hemiparesis.  Slow to respond thus his baseline





- Constitutional


Vitals: 


                                        











Temp Pulse Resp BP Pulse Ox


 


 98.6 F   88   17   134/86   98 


 


 03/25/21 07:59  03/25/21 08:05  03/25/21 08:05  03/25/21 07:59  03/25/21 08:05











General appearance: Present: no acute distress





HEART Score





- HEART Score


Risk factors: 1-2 risk factors


Troponin: 


                                        











Troponin T  < 0.010 ng/mL (0.00-0.029)   03/23/21  22:39    











Troponin: < normal limit





- Critical Actions


Critical Actions: 0-3 pts:0.9-1.7%risk of adverse cardiac event.Candidate for 

discharge





Results





- Labs


CBC & Chem 7: 


                                 03/23/21 22:39





                                 03/23/21 22:39


Labs: 


                             Laboratory Last Values











WBC  8.5 K/mm3 (4.5-11.0)   03/23/21  22:39    


 


RBC  4.11 M/mm3 (3.65-5.03)   03/23/21  22:39    


 


Hgb  13.8 gm/dl (11.8-15.2)   03/23/21  22:39    


 


Hct  40.1 % (35.5-45.6)   03/23/21  22:39    


 


MCV  98 fl (84-94)  H  03/23/21  22:39    


 


MCH  34 pg (28-32)  H  03/23/21  22:39    


 


MCHC  34 % (32-34)   03/23/21  22:39    


 


RDW  13.0 % (13.2-15.2)  L  03/23/21  22:39    


 


Plt Count  131 K/mm3 (140-440)  L  03/23/21  22:39    


 


Baso % (Auto)  Np   03/23/21  22:39    


 


Add Manual Diff  Complete   03/23/21  22:39    


 


Total Counted  100   03/23/21  22:39    


 


Seg Neuts % (Manual)  68.0 % (40.0-70.0)   03/23/21  22:39    


 


Lymphocytes % (Manual)  20.0 % (13.4-35.0)   03/23/21  22:39    


 


Monocytes % (Manual)  10.0 % (0.0-7.3)  H  03/23/21  22:39    


 


Eosinophils % (Manual)  1.0 % (0.0-4.3)   03/23/21  22:39    


 


Basophils % (Manual)  1.0 % (0.0-1.8)   03/23/21  22:39    


 


Nucleated RBC %  Not Reportable   03/23/21  22:39    


 


Seg Neutrophils # Man  5.8 K/mm3 (1.8-7.7)   03/23/21  22:39    


 


Band Neutrophils #  0.0 K/mm3  03/23/21  22:39    


 


Lymphocytes # (Manual)  1.7 K/mm3 (1.2-5.4)   03/23/21  22:39    


 


Abs React Lymphs (Man)  0.0 K/mm3  03/23/21  22:39    


 


Monocytes # (Manual)  0.9 K/mm3 (0.0-0.8)  H  03/23/21  22:39    


 


Eosinophils # (Manual)  0.1 K/mm3 (0.0-0.4)   03/23/21  22:39    


 


Basophils # (Manual)  0.1 K/mm3 (0.0-0.1)   03/23/21  22:39    


 


Metamyelocytes #  0.0 K/mm3  03/23/21  22:39    


 


Myelocytes #  0.0 K/mm3  03/23/21  22:39    


 


Promyelocytes #  0.0 K/mm3  03/23/21  22:39    


 


Blast Cells #  0.0 K/mm3  03/23/21  22:39    


 


WBC Morphology  Not Reportable   03/23/21  22:39    


 


Hypersegmented Neuts  Not Reportable   03/23/21  22:39    


 


Hyposegmented Neuts  Not Reportable   03/23/21  22:39    


 


Hypogranular Neuts  Not Reportable   03/23/21  22:39    


 


Smudge Cells  Not Reportable   03/23/21  22:39    


 


Toxic Granulation  Not Reportable   03/23/21  22:39    


 


Toxic Vacuolation  Not Reportable   03/23/21  22:39    


 


Dohle Bodies  Not Reportable   03/23/21  22:39    


 


Pelger-Huet Anomaly  Not Reportable   03/23/21  22:39    


 


Kasia Rods  Not Reportable   03/23/21  22:39    


 


Platelet Estimate  Not Reportable   03/23/21  22:39    


 


Clumped Platelets  Not Reportable   03/23/21  22:39    


 


Plt Clumps, EDTA  Not Reportable   03/23/21  22:39    


 


Large Platelets  Not Reportable   03/23/21  22:39    


 


Giant Platelets  Not Reportable   03/23/21  22:39    


 


Platelet Satelliting  Not Reportable   03/23/21  22:39    


 


Plt Morphology Comment  Not Reportable   03/23/21  22:39    


 


RBC Morphology  Normal   03/23/21  22:39    


 


Dimorphic RBCs  Not Reportable   03/23/21  22:39    


 


Polychromasia  Not Reportable   03/23/21  22:39    


 


Hypochromasia  Not Reportable   03/23/21  22:39    


 


Poikilocytosis  Not Reportable   03/23/21  22:39    


 


Anisocytosis  Not Reportable   03/23/21  22:39    


 


Microcytosis  Not Reportable   03/23/21  22:39    


 


Macrocytosis  Not Reportable   03/23/21  22:39    


 


Spherocytes  Not Reportable   03/23/21  22:39    


 


Pappenheimer Bodies  Not Reportable   03/23/21  22:39    


 


Sickle Cells  Not Reportable   03/23/21  22:39    


 


Target Cells  Not Reportable   03/23/21  22:39    


 


Tear Drop Cells  Not Reportable   03/23/21  22:39    


 


Ovalocytes  Not Reportable   03/23/21  22:39    


 


Helmet Cells  Not Reportable   03/23/21  22:39    


 


Woods-Svensen Bodies  Not Reportable   03/23/21  22:39    


 


Cabot Rings  Not Reportable   03/23/21  22:39    


 


Baltic Cells  Not Reportable   03/23/21  22:39    


 


Bite Cells  Not Reportable   03/23/21  22:39    


 


Crenated Cell  Not Reportable   03/23/21  22:39    


 


Elliptocytes  Not Reportable   03/23/21  22:39    


 


Acanthocytes (Spur)  Not Reportable   03/23/21  22:39    


 


Rouleaux  Not Reportable   03/23/21  22:39    


 


Hemoglobin C Crystals  Not Reportable   03/23/21  22:39    


 


Schistocytes  Not Reportable   03/23/21  22:39    


 


Malaria parasites  Not Reportable   03/23/21  22:39    


 


True Bodies  Not Reportable   03/23/21  22:39    


 


Hem Pathologist Commnt  No   03/23/21  22:39    


 


PT  13.0 Sec. (12.2-14.9)   03/23/21  22:39    


 


INR  1.00  (0.87-1.13)   03/23/21  22:39    


 


APTT  24.7 Sec. (24.2-36.6)   03/23/21  22:39    


 


Thrombin Time  16.6 Sec. (15.1-19.6)   03/23/21  22:39    


 


Sodium  136 mmol/L (137-145)  L  03/23/21  22:39    


 


Potassium  3.7 mmol/L (3.6-5.0)   03/23/21  22:39    


 


Chloride  100.1 mmol/L ()   03/23/21  22:39    


 


Carbon Dioxide  28 mmol/L (22-30)   03/23/21  22:39    


 


Anion Gap  12 mmol/L  03/23/21  22:39    


 


BUN  21 mg/dL (9-20)  H  03/23/21  22:39    


 


Creatinine  0.9 mg/dL (0.8-1.3)   03/23/21  22:39    


 


Estimated GFR  > 60 ml/min  03/23/21  22:39    


 


BUN/Creatinine Ratio  23 %  03/23/21  22:39    


 


Glucose  108 mg/dL ()  H  03/23/21  22:39    


 


POC Glucose  107 mg/dL ()  H  03/24/21  20:00    


 


Calcium  9.3 mg/dL (8.4-10.2)   03/23/21  22:39    


 


Magnesium  2.00 mg/dL (1.7-2.3)   03/23/21  22:39    


 


Total Bilirubin  1.00 mg/dL (0.1-1.2)   03/23/21  22:39    


 


AST  24 units/L (5-40)   03/23/21  22:39    


 


ALT  14 units/L (7-56)   03/23/21  22:39    


 


Alkaline Phosphatase  74 units/L ()   03/23/21  22:39    


 


Total Creatine Kinase  68 units/L ()   03/23/21  22:39    


 


CK-MB (CK-2)  < 1.0 ng/mL (0.0-4.0)   03/23/21  22:39    


 


CK-MB (CK-2) Rel Index  1.4  (0-4)   03/23/21  22:39    


 


Troponin T  < 0.010 ng/mL (0.00-0.029)   03/23/21  22:39    


 


Total Protein  6.8 g/dL (6.3-8.2)   03/23/21  22:39    


 


Albumin  4.5 g/dL (3.9-5)   03/23/21  22:39    


 


Albumin/Globulin Ratio  2.0 %  03/23/21  22:39    


 


Urine Color  Yellow  (Yellow)   03/23/21  Unknown


 


Urine Turbidity  Clear  (Clear)   03/23/21  Unknown


 


Urine pH  6.0  (5.0-7.0)   03/23/21  Unknown


 


Ur Specific Gravity  > 1.059  (1.003-1.030)  H  03/23/21  Unknown


 


Urine Protein  <15 mg/dl mg/dL (Negative)   03/23/21  Unknown


 


Urine Glucose (UA)  Neg mg/dL (Negative)   03/23/21  Unknown


 


Urine Ketones  Neg mg/dL (Negative)   03/23/21  Unknown


 


Urine Blood  Neg  (Negative)   03/23/21  Unknown


 


Urine Nitrite  Neg  (Negative)   03/23/21  Unknown


 


Urine Bilirubin  Neg  (Negative)   03/23/21  Unknown


 


Urine Urobilinogen  < 2.0 mg/dL (<2.0)   03/23/21  Unknown


 


Ur Leukocyte Esterase  Neg  (Negative)   03/23/21  Unknown


 


Urine WBC (Auto)  < 1.0 /HPF (0.0-6.0)   03/23/21  Unknown


 


Urine RBC (Auto)  1.0 /HPF (0.0-6.0)   03/23/21  Unknown


 


U Epithel Cells (Auto)  1.0 /HPF (0-13.0)   03/23/21  Unknown


 


Urine Mucus  Few /HPF  03/23/21  Unknown


 


Urine Opiates Screen  Presumptive negative   03/23/21  Unknown


 


Urine Methadone Screen  Presumptive negative   03/23/21  Unknown


 


Ur Barbiturates Screen  Presumptive negative   03/23/21  Unknown


 


Ur Phencyclidine Scrn  Presumptive negative   03/23/21  Unknown


 


Ur Amphetamines Screen  Presumptive negative   03/23/21  Unknown


 


U Benzodiazepines Scrn  Presumptive negative   03/23/21  Unknown


 


Urine Cocaine Screen  Presumptive negative   03/23/21  Unknown


 


U Marijuana (THC) Screen  Presumptive negative   03/23/21  Unknown


 


Drugs of Abuse Note  Disclamer   03/23/21  Unknown


 


Plasma/Serum Alcohol  < 0.01 % (0-0.07)   03/23/21  22:39    











Gamble/IV: 


                                        





Voiding Method                   Condom Catheter











Active Medications





- Current Medications


Current Medications: 














Generic Name Dose Route Start Last Admin





  Trade Name Freq  PRN Reason Stop Dose Admin


 


Acetaminophen  650 mg  03/24/21 02:02 





  Acetaminophen 325 Mg Tab  PO  





  Q4H PRN  





  Headache  


 


Aspirin  325 mg  03/24/21 10:00  03/24/21 09:18





  Aspirin 325 Mg Tab  PO   Not Given





  QDAY FAIZA  


 


Levetiracetam 750 mg/ Dextrose  107.5 mls @ 400 mls/hr  03/24/21 10:00  03/24/21

21:29





  IV   400 mls/hr





  Q12HR FAIZA   Administration


 


Lorazepam  1 mg  03/24/21 08:30  03/24/21 20:34





  Lorazepam 2 Mg/Ml Vial  IV   1 mg





  Q1H PRN   Administration





  Seizures  


 


Ondansetron HCl  4 mg  03/24/21 02:03 





  Ondansetron 4 Mg/2 Ml Inj  IV  





  Q8H PRN  





  Nausea And Vomiting

## 2021-03-25 NOTE — ELECTROCARDIOGRAPH REPORT
[facility]

                                       

Test Date:    2021               Test Time:    22:36:14

Pat Name:     STEPHIE SHAH JR       Department:   

Patient ID:   [id number]          Room:         A470

Gender:       M                        Technician:   ETHAN

:          1970               Requested By: BRUNO PATHAK

Order Number: [id number]              Reading MD:   Shaun Johnson

                                 Measurements

Intervals                              Axis          

Rate:         61                       P:            85

KS:           166                      QRS:          97

QRSD:         134                      T:            52

QT:           430                                    

QTc:          434                                    

                           Interpretive Statements

Sinus rhythm

RBBB and LPFB

ST elev, probable normal early repol pattern

No previous ECG available for comparison

Electronically Signed On 3- 8:14:37 PDT by Shaun Johnson

## 2021-03-26 RX ADMIN — ASPIRIN SCH MG: 325 TABLET ORAL at 09:12

## 2021-03-26 NOTE — MAGNETIC RESONANCE REPORT
MR brain wo/w con



INDICATION / CLINICAL INFORMATION:

51 years Male; MAIN.



TECHNIQUE: 

Multiplanar, multisequence MR images of the brain were obtained.



COMPARISON: 

None available.



FINDINGS:



BRAIN / INTRACRANIAL CONTENTS: No acute hemorrhage, mass effect, midline shift, hydrocephalus, or acu
te, large territorial infarct. No chronic infarct or atrophy. Minimal, nonspecific white matter disea
se identified. 



I see no signs of abnormal enhancement following contrast administration.



CRANIOCERVICAL JUNCTION: No significant abnormality.

VASCULAR FLOW-VOIDS: No significant abnormality.



ORBITS: No significant abnormality of visualized orbits.

SINUSES / MASTOIDS: Minimal mucosal thickening in the ethmoids. Small mucous retention cyst/polyp see
n along the roof of the left maxillary antrum.



ADDITIONAL FINDINGS: None. 



IMPRESSION:

1. No focal mass, hemorrhage, hydrocephalus, or acute ischemia.



Signer Name: Everardo Mayo MD, III 

Signed: 3/26/2021 2:27 PM

Workstation Name: [id number]

## 2021-03-26 NOTE — PROGRESS NOTE
Assessment and Plan


Assessment and plan: 





(1) Altered mental status


Current Visit: Yes   Status: Acute   


Plan to address problem: 


1. NEUROLOGY CONSULT








(2) History of seizure


Current Visit: Yes   Status: Acute   


Plan to address problem: 


1. NEUROLOGY CONSULT


 2. SEIZURE PRECAUTIONS


 3. I.V ATIVAN PRN SEIZURE


 4. SEIZURE MEDICATION TO BE CONTINUED


 








(3) Right sided weakness


Current Visit: Yes   Status: Acute   


Plan to address problem: 


1. NEUROLOGY CONSULT


 2. PHYSICAL THERAPY








(4) Speech disturbance


Current Visit: Yes   Status: Acute   


Plan to address problem: 


1. NPO UNTIL SWALLOW TEST PASSED


 2. NEUROLOGY CONSULT


 3. SPEECH THERAPY CONSULT





3/25/2021


-I have seen and evaluated the patient, patient said he is feeling okay.  

Patient was alert and communicative but slow to respond, that is his baseline 

based on his wife.  No seizure after admission.  Patient was evaluated by his 

therapy. 


Neurology consulted and recommend MRI with and without contrast, increase Keppra

to 1000mg BID but was not ordered.  EEG ordered


Increase the Keppra, waiting neurology if he still needs MRI.


Patient was evaluated by physical therapy and recommend acute rehab.





3/26/2021


-Was seen by neurology and recommend MRI, suspected stroke


-I put the patient on Keppra 1000 mg twice daily, atorvastatin and statin


-Patient was evaluated by physical therapy and recommend acute rehab, case manag

ement is aware and working on it.


-His speech is currently at baseline.


-Patient trying to get out of the bed and patient is on restraints





History


Interval history: 





Patient was seen and evaluated this morning


Patient did have seizure after admission


Patient was communicative but slow to speak, which is his baseline according to 

his wife


The nurse said he was trying to get out of the bed, code gray was called





Hospitalist Physical





- Physical exam


Narrative exam: 





 Not in cardiopulmonary distress. 


 The patient appeared well nourished and normally developed.


 Vital signs as documented.


 Head exam is unremarkable.


 No scleral icterus .


 Neck is without jugular venous distension, thyromegaly, or carotid bruits. 


 Lungs are clear to auscultation.


Cardiac exam reveals regular rate and  Rhythm. 


Abdominal exam reveals normal bowel sounds, nontender, no organomegaly.


Extremities are nonedematous and both femoral and pedal pulses are normal.


CNS: Alert .  Right-sided hemiparesis.  Slow to respond thus his baseline





- Constitutional


Vitals: 


                                        











Temp Pulse Resp BP Pulse Ox


 


 98.2 F   83   16   146/92   98 


 


 03/26/21 03:25  03/26/21 05:00  03/26/21 03:25  03/26/21 03:25  03/26/21 03:25











General appearance: Present: no acute distress





HEART Score





- HEART Score


Risk factors: 1-2 risk factors


Troponin: 


                                        











Troponin T  < 0.010 ng/mL (0.00-0.029)   03/23/21  22:39    











Troponin: < normal limit





- Critical Actions


Critical Actions: 0-3 pts:0.9-1.7%risk of adverse cardiac event.Candidate for 

discharge





Results





- Labs


CBC & Chem 7: 


                                 03/23/21 22:39





                                 03/23/21 22:39


Labs: 


                             Laboratory Last Values











WBC  8.5 K/mm3 (4.5-11.0)   03/23/21  22:39    


 


RBC  4.11 M/mm3 (3.65-5.03)   03/23/21  22:39    


 


Hgb  13.8 gm/dl (11.8-15.2)   03/23/21  22:39    


 


Hct  40.1 % (35.5-45.6)   03/23/21  22:39    


 


MCV  98 fl (84-94)  H  03/23/21  22:39    


 


MCH  34 pg (28-32)  H  03/23/21  22:39    


 


MCHC  34 % (32-34)   03/23/21  22:39    


 


RDW  13.0 % (13.2-15.2)  L  03/23/21  22:39    


 


Plt Count  131 K/mm3 (140-440)  L  03/23/21  22:39    


 


Baso % (Auto)  Np   03/23/21  22:39    


 


Add Manual Diff  Complete   03/23/21  22:39    


 


Total Counted  100   03/23/21  22:39    


 


Seg Neuts % (Manual)  68.0 % (40.0-70.0)   03/23/21  22:39    


 


Lymphocytes % (Manual)  20.0 % (13.4-35.0)   03/23/21  22:39    


 


Monocytes % (Manual)  10.0 % (0.0-7.3)  H  03/23/21  22:39    


 


Eosinophils % (Manual)  1.0 % (0.0-4.3)   03/23/21  22:39    


 


Basophils % (Manual)  1.0 % (0.0-1.8)   03/23/21  22:39    


 


Nucleated RBC %  Not Reportable   03/23/21  22:39    


 


Seg Neutrophils # Man  5.8 K/mm3 (1.8-7.7)   03/23/21  22:39    


 


Band Neutrophils #  0.0 K/mm3  03/23/21  22:39    


 


Lymphocytes # (Manual)  1.7 K/mm3 (1.2-5.4)   03/23/21  22:39    


 


Abs React Lymphs (Man)  0.0 K/mm3  03/23/21  22:39    


 


Monocytes # (Manual)  0.9 K/mm3 (0.0-0.8)  H  03/23/21  22:39    


 


Eosinophils # (Manual)  0.1 K/mm3 (0.0-0.4)   03/23/21  22:39    


 


Basophils # (Manual)  0.1 K/mm3 (0.0-0.1)   03/23/21  22:39    


 


Metamyelocytes #  0.0 K/mm3  03/23/21  22:39    


 


Myelocytes #  0.0 K/mm3  03/23/21  22:39    


 


Promyelocytes #  0.0 K/mm3  03/23/21  22:39    


 


Blast Cells #  0.0 K/mm3  03/23/21  22:39    


 


WBC Morphology  Not Reportable   03/23/21  22:39    


 


Hypersegmented Neuts  Not Reportable   03/23/21  22:39    


 


Hyposegmented Neuts  Not Reportable   03/23/21  22:39    


 


Hypogranular Neuts  Not Reportable   03/23/21  22:39    


 


Smudge Cells  Not Reportable   03/23/21  22:39    


 


Toxic Granulation  Not Reportable   03/23/21  22:39    


 


Toxic Vacuolation  Not Reportable   03/23/21  22:39    


 


Dohle Bodies  Not Reportable   03/23/21  22:39    


 


Pelger-Huet Anomaly  Not Reportable   03/23/21  22:39    


 


Kasia Rods  Not Reportable   03/23/21  22:39    


 


Platelet Estimate  Not Reportable   03/23/21  22:39    


 


Clumped Platelets  Not Reportable   03/23/21  22:39    


 


Plt Clumps, EDTA  Not Reportable   03/23/21  22:39    


 


Large Platelets  Not Reportable   03/23/21  22:39    


 


Giant Platelets  Not Reportable   03/23/21  22:39    


 


Platelet Satelliting  Not Reportable   03/23/21  22:39    


 


Plt Morphology Comment  Not Reportable   03/23/21  22:39    


 


RBC Morphology  Normal   03/23/21  22:39    


 


Dimorphic RBCs  Not Reportable   03/23/21  22:39    


 


Polychromasia  Not Reportable   03/23/21  22:39    


 


Hypochromasia  Not Reportable   03/23/21  22:39    


 


Poikilocytosis  Not Reportable   03/23/21  22:39    


 


Anisocytosis  Not Reportable   03/23/21  22:39    


 


Microcytosis  Not Reportable   03/23/21  22:39    


 


Macrocytosis  Not Reportable   03/23/21  22:39    


 


Spherocytes  Not Reportable   03/23/21  22:39    


 


Pappenheimer Bodies  Not Reportable   03/23/21  22:39    


 


Sickle Cells  Not Reportable   03/23/21  22:39    


 


Target Cells  Not Reportable   03/23/21  22:39    


 


Tear Drop Cells  Not Reportable   03/23/21  22:39    


 


Ovalocytes  Not Reportable   03/23/21  22:39    


 


Helmet Cells  Not Reportable   03/23/21  22:39    


 


Woods-Aristes Bodies  Not Reportable   03/23/21  22:39    


 


Cabot Rings  Not Reportable   03/23/21  22:39    


 


Primitivo Cells  Not Reportable   03/23/21  22:39    


 


Bite Cells  Not Reportable   03/23/21  22:39    


 


Crenated Cell  Not Reportable   03/23/21  22:39    


 


Elliptocytes  Not Reportable   03/23/21  22:39    


 


Acanthocytes (Spur)  Not Reportable   03/23/21  22:39    


 


Rouleaux  Not Reportable   03/23/21  22:39    


 


Hemoglobin C Crystals  Not Reportable   03/23/21  22:39    


 


Schistocytes  Not Reportable   03/23/21  22:39    


 


Malaria parasites  Not Reportable   03/23/21  22:39    


 


True Bodies  Not Reportable   03/23/21  22:39    


 


Hem Pathologist Commnt  No   03/23/21  22:39    


 


PT  13.0 Sec. (12.2-14.9)   03/23/21  22:39    


 


INR  1.00  (0.87-1.13)   03/23/21  22:39    


 


APTT  24.7 Sec. (24.2-36.6)   03/23/21  22:39    


 


Thrombin Time  16.6 Sec. (15.1-19.6)   03/23/21  22:39    


 


Sodium  136 mmol/L (137-145)  L  03/23/21  22:39    


 


Potassium  3.7 mmol/L (3.6-5.0)   03/23/21  22:39    


 


Chloride  100.1 mmol/L ()   03/23/21  22:39    


 


Carbon Dioxide  28 mmol/L (22-30)   03/23/21  22:39    


 


Anion Gap  12 mmol/L  03/23/21  22:39    


 


BUN  21 mg/dL (9-20)  H  03/23/21  22:39    


 


Creatinine  0.9 mg/dL (0.8-1.3)   03/23/21  22:39    


 


Estimated GFR  > 60 ml/min  03/23/21  22:39    


 


BUN/Creatinine Ratio  23 %  03/23/21  22:39    


 


Glucose  108 mg/dL ()  H  03/23/21  22:39    


 


POC Glucose  107 mg/dL ()  H  03/24/21  20:00    


 


Calcium  9.3 mg/dL (8.4-10.2)   03/23/21  22:39    


 


Magnesium  2.00 mg/dL (1.7-2.3)   03/23/21  22:39    


 


Total Bilirubin  1.00 mg/dL (0.1-1.2)   03/23/21  22:39    


 


AST  24 units/L (5-40)   03/23/21  22:39    


 


ALT  14 units/L (7-56)   03/23/21  22:39    


 


Alkaline Phosphatase  74 units/L ()   03/23/21  22:39    


 


Total Creatine Kinase  68 units/L ()   03/23/21  22:39    


 


CK-MB (CK-2)  < 1.0 ng/mL (0.0-4.0)   03/23/21  22:39    


 


CK-MB (CK-2) Rel Index  1.4  (0-4)   03/23/21  22:39    


 


Troponin T  < 0.010 ng/mL (0.00-0.029)   03/23/21  22:39    


 


Total Protein  6.8 g/dL (6.3-8.2)   03/23/21  22:39    


 


Albumin  4.5 g/dL (3.9-5)   03/23/21  22:39    


 


Albumin/Globulin Ratio  2.0 %  03/23/21  22:39    


 


Triglycerides  67 mg/dL (2-149)   03/25/21  18:53    


 


Cholesterol  131 mg/dL ()   03/25/21  18:53    


 


LDL Cholesterol Direct  81 mg/dL ()   03/25/21  18:53    


 


HDL Cholesterol  48 mg/dL (40-59)   03/25/21  18:53    


 


Cholesterol/HDL Ratio  2.72 %  03/25/21  18:53    


 


Urine Color  Yellow  (Yellow)   03/23/21  Unknown


 


Urine Turbidity  Clear  (Clear)   03/23/21  Unknown


 


Urine pH  6.0  (5.0-7.0)   03/23/21  Unknown


 


Ur Specific Gravity  > 1.059  (1.003-1.030)  H  03/23/21  Unknown


 


Urine Protein  <15 mg/dl mg/dL (Negative)   03/23/21  Unknown


 


Urine Glucose (UA)  Neg mg/dL (Negative)   03/23/21  Unknown


 


Urine Ketones  Neg mg/dL (Negative)   03/23/21  Unknown


 


Urine Blood  Neg  (Negative)   03/23/21  Unknown


 


Urine Nitrite  Neg  (Negative)   03/23/21  Unknown


 


Urine Bilirubin  Neg  (Negative)   03/23/21  Unknown


 


Urine Urobilinogen  < 2.0 mg/dL (<2.0)   03/23/21  Unknown


 


Ur Leukocyte Esterase  Neg  (Negative)   03/23/21  Unknown


 


Urine WBC (Auto)  < 1.0 /HPF (0.0-6.0)   03/23/21  Unknown


 


Urine RBC (Auto)  1.0 /HPF (0.0-6.0)   03/23/21  Unknown


 


U Epithel Cells (Auto)  1.0 /HPF (0-13.0)   03/23/21  Unknown


 


Urine Mucus  Few /HPF  03/23/21  Unknown


 


Urine Opiates Screen  Presumptive negative   03/23/21  Unknown


 


Urine Methadone Screen  Presumptive negative   03/23/21  Unknown


 


Ur Barbiturates Screen  Presumptive negative   03/23/21  Unknown


 


Ur Phencyclidine Scrn  Presumptive negative   03/23/21  Unknown


 


Ur Amphetamines Screen  Presumptive negative   03/23/21  Unknown


 


U Benzodiazepines Scrn  Presumptive negative   03/23/21  Unknown


 


Urine Cocaine Screen  Presumptive negative   03/23/21  Unknown


 


U Marijuana (THC) Screen  Presumptive negative   03/23/21  Unknown


 


Drugs of Abuse Note  Disclamer   03/23/21  Unknown


 


Plasma/Serum Alcohol  < 0.01 % (0-0.07)   03/23/21  22:39    











Gamble/IV: 


                                        





Voiding Method                   Condom Catheter











Active Medications





- Current Medications


Current Medications: 














Generic Name Dose Route Start Last Admin





  Trade Name Freq  PRN Reason Stop Dose Admin


 


Acetaminophen  650 mg  03/24/21 02:02 





  Acetaminophen 325 Mg Tab  PO  





  Q4H PRN  





  Headache  


 


Aspirin  325 mg  03/24/21 10:00  03/25/21 09:16





  Aspirin 325 Mg Tab  PO   325 mg





  QDAY FAIZA   Administration


 


Atorvastatin Calcium  40 mg  03/25/21 22:00  03/25/21 21:28





  Atorvastatin 40 Mg Tab  PO   40 mg





  QHS FAIZA   Administration


 


Levetiracetam  1,000 mg  03/25/21 10:00  03/25/21 21:28





  Levetiracetam 500 Mg Tab  PO   1,000 mg





  BID FAIAZ   Administration


 


Lorazepam  1 mg  03/24/21 08:30  03/24/21 20:34





  Lorazepam 2 Mg/Ml Vial  IV   1 mg





  Q1H PRN   Administration





  Seizures  


 


Ondansetron HCl  4 mg  03/24/21 02:03 





  Ondansetron 4 Mg/2 Ml Inj  IV  





  Q8H PRN  





  Nausea And Vomiting

## 2021-03-27 LAB
BUN SERPL-MCNC: 20 MG/DL (ref 9–20)
BUN/CREAT SERPL: 25 %
CALCIUM SERPL-MCNC: 9.1 MG/DL (ref 8.4–10.2)
HEMOLYSIS INDEX: 18

## 2021-03-27 RX ADMIN — ASPIRIN SCH MG: 325 TABLET ORAL at 09:19

## 2021-03-27 NOTE — PROGRESS NOTE
Assessment and Plan


Assessment and plan: 





(1) Altered mental status


Current Visit: Yes   Status: Acute   


Plan to address problem: 


1. NEUROLOGY CONSULT








(2) History of seizure


Current Visit: Yes   Status: Acute   


Plan to address problem: 


1. NEUROLOGY CONSULT


 2. SEIZURE PRECAUTIONS


 3. I.V ATIVAN PRN SEIZURE


 4. SEIZURE MEDICATION TO BE CONTINUED


 








(3) Right sided weakness


Current Visit: Yes   Status: Acute   


Plan to address problem: 


1. NEUROLOGY CONSULT


 2. PHYSICAL THERAPY








(4) Speech disturbance


Current Visit: Yes   Status: Acute   


Plan to address problem: 


1. NPO UNTIL SWALLOW TEST PASSED


 2. NEUROLOGY CONSULT


 3. SPEECH THERAPY CONSULT





3/25/2021


-I have seen and evaluated the patient, patient said he is feeling okay.  

Patient was alert and communicative but slow to respond, that is his baseline 

based on his wife.  No seizure after admission.  Patient was evaluated by his 

therapy. 


Neurology consulted and recommend MRI with and without contrast, increase Keppra

to 1000mg BID but was not ordered.  EEG ordered


Increase the Keppra, waiting neurology if he still needs MRI.


Patient was evaluated by physical therapy and recommend acute rehab.





3/26/2021


-Was seen by neurology and recommend MRI, suspected stroke


-I put the patient on Keppra 1000 mg twice daily, atorvastatin and statin


-Patient was evaluated by physical therapy and recommend acute rehab, case manag

ement is aware and working on it.


-His speech is currently at baseline.


-Patient trying to get out of the bed and patient is on restraints





3/27/2021


-MRI of head was done and negative for acute normalities.  Echo, CTA head, neck 

were unremarkable.  No seizure after admission.


-Patient was evaluated by PT OT and recommend subacute rehab.  Discussed with 

patient's wife about the management plan in detail.  And was in agreement with 

subacute rehab.  Discussed with case management.





History


Interval history: 





Patient was seen and evaluated this morning


Patient did have seizure after admission


Patient was communicative but slow to speak, which is his baseline according to 

his wife


Patient was trying to get out of bed and have to be on restraints





Hospitalist Physical





- Physical exam


Narrative exam: 





 Not in cardiopulmonary distress. 


 The patient appeared well nourished and normally developed.


 Vital signs as documented.


 Head exam is unremarkable.


 No scleral icterus .


 Neck is without jugular venous distension, thyromegaly, or carotid bruits. 


 Lungs are clear to auscultation.


Cardiac exam reveals regular rate and  Rhythm. 


Abdominal exam reveals normal bowel sounds, nontender, no organomegaly.


Extremities are nonedematous and both femoral and pedal pulses are normal.


CNS: Alert .  Right-sided hemiparesis.  Slow to respond thus his baseline





- Constitutional


Vitals: 


                                        











Temp Pulse Resp BP Pulse Ox


 


 98.1 F   83   16   138/90   94 


 


 03/27/21 07:22  03/27/21 07:22  03/27/21 07:22  03/27/21 07:22  03/27/21 07:22











General appearance: Present: no acute distress





HEART Score





- HEART Score


Risk factors: 1-2 risk factors


Troponin: 


                                        











Troponin T  < 0.010 ng/mL (0.00-0.029)   03/23/21  22:39    











Troponin: < normal limit





- Critical Actions


Critical Actions: 0-3 pts:0.9-1.7%risk of adverse cardiac event.Candidate for 

discharge





Results





- Labs


CBC & Chem 7: 


                                 03/23/21 22:39





                                 03/27/21 04:46


Labs: 


                             Laboratory Last Values











WBC  8.5 K/mm3 (4.5-11.0)   03/23/21  22:39    


 


RBC  4.11 M/mm3 (3.65-5.03)   03/23/21  22:39    


 


Hgb  13.8 gm/dl (11.8-15.2)   03/23/21  22:39    


 


Hct  40.1 % (35.5-45.6)   03/23/21  22:39    


 


MCV  98 fl (84-94)  H  03/23/21  22:39    


 


MCH  34 pg (28-32)  H  03/23/21  22:39    


 


MCHC  34 % (32-34)   03/23/21  22:39    


 


RDW  13.0 % (13.2-15.2)  L  03/23/21  22:39    


 


Plt Count  131 K/mm3 (140-440)  L  03/23/21  22:39    


 


Baso % (Auto)  Np   03/23/21  22:39    


 


Add Manual Diff  Complete   03/23/21  22:39    


 


Total Counted  100   03/23/21  22:39    


 


Seg Neuts % (Manual)  68.0 % (40.0-70.0)   03/23/21  22:39    


 


Lymphocytes % (Manual)  20.0 % (13.4-35.0)   03/23/21  22:39    


 


Monocytes % (Manual)  10.0 % (0.0-7.3)  H  03/23/21  22:39    


 


Eosinophils % (Manual)  1.0 % (0.0-4.3)   03/23/21  22:39    


 


Basophils % (Manual)  1.0 % (0.0-1.8)   03/23/21  22:39    


 


Nucleated RBC %  Not Reportable   03/23/21  22:39    


 


Seg Neutrophils # Man  5.8 K/mm3 (1.8-7.7)   03/23/21  22:39    


 


Band Neutrophils #  0.0 K/mm3  03/23/21  22:39    


 


Lymphocytes # (Manual)  1.7 K/mm3 (1.2-5.4)   03/23/21  22:39    


 


Abs React Lymphs (Man)  0.0 K/mm3  03/23/21  22:39    


 


Monocytes # (Manual)  0.9 K/mm3 (0.0-0.8)  H  03/23/21  22:39    


 


Eosinophils # (Manual)  0.1 K/mm3 (0.0-0.4)   03/23/21  22:39    


 


Basophils # (Manual)  0.1 K/mm3 (0.0-0.1)   03/23/21  22:39    


 


Metamyelocytes #  0.0 K/mm3  03/23/21  22:39    


 


Myelocytes #  0.0 K/mm3  03/23/21  22:39    


 


Promyelocytes #  0.0 K/mm3  03/23/21  22:39    


 


Blast Cells #  0.0 K/mm3  03/23/21  22:39    


 


WBC Morphology  Not Reportable   03/23/21  22:39    


 


Hypersegmented Neuts  Not Reportable   03/23/21  22:39    


 


Hyposegmented Neuts  Not Reportable   03/23/21  22:39    


 


Hypogranular Neuts  Not Reportable   03/23/21  22:39    


 


Smudge Cells  Not Reportable   03/23/21  22:39    


 


Toxic Granulation  Not Reportable   03/23/21  22:39    


 


Toxic Vacuolation  Not Reportable   03/23/21  22:39    


 


Dohle Bodies  Not Reportable   03/23/21  22:39    


 


Pelger-Huet Anomaly  Not Reportable   03/23/21  22:39    


 


Kasia Rods  Not Reportable   03/23/21  22:39    


 


Platelet Estimate  Not Reportable   03/23/21  22:39    


 


Clumped Platelets  Not Reportable   03/23/21  22:39    


 


Plt Clumps, EDTA  Not Reportable   03/23/21  22:39    


 


Large Platelets  Not Reportable   03/23/21  22:39    


 


Giant Platelets  Not Reportable   03/23/21  22:39    


 


Platelet Satelliting  Not Reportable   03/23/21  22:39    


 


Plt Morphology Comment  Not Reportable   03/23/21  22:39    


 


RBC Morphology  Normal   03/23/21  22:39    


 


Dimorphic RBCs  Not Reportable   03/23/21  22:39    


 


Polychromasia  Not Reportable   03/23/21  22:39    


 


Hypochromasia  Not Reportable   03/23/21  22:39    


 


Poikilocytosis  Not Reportable   03/23/21  22:39    


 


Anisocytosis  Not Reportable   03/23/21  22:39    


 


Microcytosis  Not Reportable   03/23/21  22:39    


 


Macrocytosis  Not Reportable   03/23/21  22:39    


 


Spherocytes  Not Reportable   03/23/21  22:39    


 


Pappenheimer Bodies  Not Reportable   03/23/21  22:39    


 


Sickle Cells  Not Reportable   03/23/21  22:39    


 


Target Cells  Not Reportable   03/23/21  22:39    


 


Tear Drop Cells  Not Reportable   03/23/21  22:39    


 


Ovalocytes  Not Reportable   03/23/21  22:39    


 


Helmet Cells  Not Reportable   03/23/21  22:39    


 


[place] Bodies  Not Reportable   03/23/21  22:39    


 


Cabot Rings  Not Reportable   03/23/21  22:39    


 


Primitivo Cells  Not Reportable   03/23/21  22:39    


 


Bite Cells  Not Reportable   03/23/21  22:39    


 


Crenated Cell  Not Reportable   03/23/21  22:39    


 


Elliptocytes  Not Reportable   03/23/21  22:39    


 


Acanthocytes (Spur)  Not Reportable   03/23/21  22:39    


 


Rouleaux  Not Reportable   03/23/21  22:39    


 


Hemoglobin C Crystals  Not Reportable   03/23/21  22:39    


 


Schistocytes  Not Reportable   03/23/21  22:39    


 


Malaria parasites  Not Reportable   03/23/21  22:39    


 


True Bodies  Not Reportable   03/23/21  22:39    


 


Hem Pathologist Commnt  No   03/23/21  22:39    


 


PT  13.0 Sec. (12.2-14.9)   03/23/21  22:39    


 


INR  1.00  (0.87-1.13)   03/23/21  22:39    


 


APTT  24.7 Sec. (24.2-36.6)   03/23/21  22:39    


 


Thrombin Time  16.6 Sec. (15.1-19.6)   03/23/21  22:39    


 


Sodium  140 mmol/L (137-145)   03/27/21  04:46    


 


Potassium  3.6 mmol/L (3.6-5.0)   03/27/21  04:46    


 


Chloride  104.3 mmol/L ()   03/27/21  04:46    


 


Carbon Dioxide  25 mmol/L (22-30)   03/27/21  04:46    


 


Anion Gap  14 mmol/L  03/27/21  04:46    


 


BUN  20 mg/dL (9-20)   03/27/21  04:46    


 


Creatinine  0.8 mg/dL (0.8-1.3)   03/27/21  04:46    


 


Estimated GFR  > 60 ml/min  03/27/21  04:46    


 


BUN/Creatinine Ratio  25 %  03/27/21  04:46    


 


Glucose  85 mg/dL ()   03/27/21  04:46    


 


POC Glucose  107 mg/dL ()  H  03/24/21  20:00    


 


Calcium  9.1 mg/dL (8.4-10.2)   03/27/21  04:46    


 


Magnesium  2.00 mg/dL (1.7-2.3)   03/23/21  22:39    


 


Total Bilirubin  1.00 mg/dL (0.1-1.2)   03/23/21  22:39    


 


AST  24 units/L (5-40)   03/23/21  22:39    


 


ALT  14 units/L (7-56)   03/23/21  22:39    


 


Alkaline Phosphatase  74 units/L ()   03/23/21  22:39    


 


Total Creatine Kinase  68 units/L ()   03/23/21  22:39    


 


CK-MB (CK-2)  < 1.0 ng/mL (0.0-4.0)   03/23/21  22:39    


 


CK-MB (CK-2) Rel Index  1.4  (0-4)   03/23/21  22:39    


 


Troponin T  < 0.010 ng/mL (0.00-0.029)   03/23/21  22:39    


 


Total Protein  6.8 g/dL (6.3-8.2)   03/23/21  22:39    


 


Albumin  4.5 g/dL (3.9-5)   03/23/21  22:39    


 


Albumin/Globulin Ratio  2.0 %  03/23/21  22:39    


 


Triglycerides  67 mg/dL (2-149)   03/25/21  18:53    


 


Cholesterol  131 mg/dL ()   03/25/21  18:53    


 


LDL Cholesterol Direct  81 mg/dL ()   03/25/21  18:53    


 


HDL Cholesterol  48 mg/dL (40-59)   03/25/21  18:53    


 


Cholesterol/HDL Ratio  2.72 %  03/25/21  18:53    


 


Urine Color  Yellow  (Yellow)   03/23/21  Unknown


 


Urine Turbidity  Clear  (Clear)   03/23/21  Unknown


 


Urine pH  6.0  (5.0-7.0)   03/23/21  Unknown


 


Ur Specific Gravity  > 1.059  (1.003-1.030)  H  03/23/21  Unknown


 


Urine Protein  <15 mg/dl mg/dL (Negative)   03/23/21  Unknown


 


Urine Glucose (UA)  Neg mg/dL (Negative)   03/23/21  Unknown


 


Urine Ketones  Neg mg/dL (Negative)   03/23/21  Unknown


 


Urine Blood  Neg  (Negative)   03/23/21  Unknown


 


Urine Nitrite  Neg  (Negative)   03/23/21  Unknown


 


Urine Bilirubin  Neg  (Negative)   03/23/21  Unknown


 


Urine Urobilinogen  < 2.0 mg/dL (<2.0)   03/23/21  Unknown


 


Ur Leukocyte Esterase  Neg  (Negative)   03/23/21  Unknown


 


Urine WBC (Auto)  < 1.0 /HPF (0.0-6.0)   03/23/21  Unknown


 


Urine RBC (Auto)  1.0 /HPF (0.0-6.0)   03/23/21  Unknown


 


U Epithel Cells (Auto)  1.0 /HPF (0-13.0)   03/23/21  Unknown


 


Urine Mucus  Few /HPF  03/23/21  Unknown


 


Urine Opiates Screen  Presumptive negative   03/23/21  Unknown


 


Urine Methadone Screen  Presumptive negative   03/23/21  Unknown


 


Ur Barbiturates Screen  Presumptive negative   03/23/21  Unknown


 


Ur Phencyclidine Scrn  Presumptive negative   03/23/21  Unknown


 


Ur Amphetamines Screen  Presumptive negative   03/23/21  Unknown


 


U Benzodiazepines Scrn  Presumptive negative   03/23/21  Unknown


 


Urine Cocaine Screen  Presumptive negative   03/23/21  Unknown


 


U Marijuana (THC) Screen  Presumptive negative   03/23/21  Unknown


 


Drugs of Abuse Note  Disclamer   03/23/21  Unknown


 


Plasma/Serum Alcohol  < 0.01 % (0-0.07)   03/23/21  22:39    


 


Coronavirus (PCR)  Negative  (Negative)   03/26/21  Unknown











Gamble/IV: 


                                        





Voiding Method                   Incontinent











Active Medications





- Current Medications


Current Medications: 














Generic Name Dose Route Start Last Admin





  Trade Name Freq  PRN Reason Stop Dose Admin


 


Acetaminophen  650 mg  03/24/21 02:02 





  Acetaminophen 325 Mg Tab  PO  





  Q4H PRN  





  Headache  


 


Aspirin  325 mg  03/24/21 10:00  03/26/21 09:12





  Aspirin 325 Mg Tab  PO   325 mg





  QDAY FAIZA   Administration


 


Atorvastatin Calcium  40 mg  03/25/21 22:00  03/26/21 22:06





  Atorvastatin 40 Mg Tab  PO   40 mg





  QHS FAIZA   Administration


 


Levetiracetam  1,000 mg  03/25/21 10:00  03/26/21 22:06





  Levetiracetam 500 Mg Tab  PO   1,000 mg





  BID FAIZA   Administration


 


Lorazepam  1 mg  03/24/21 08:30  03/26/21 11:51





  Lorazepam 2 Mg/Ml Vial  IV   1 mg





  Q1H PRN   Administration





  Seizures  


 


Ondansetron HCl  4 mg  03/24/21 02:03 





  Ondansetron 4 Mg/2 Ml Inj  IV  





  Q8H PRN  





  Nausea And Vomiting

## 2021-03-28 RX ADMIN — ASPIRIN SCH MG: 325 TABLET ORAL at 09:26

## 2021-03-28 NOTE — PROGRESS NOTE
Assessment and Plan


Assessment and plan: 





(1) Altered mental status


Current Visit: Yes   Status: Acute   


Plan to address problem: 


1. NEUROLOGY CONSULT





Delirium


-Supportive care





(2) History of seizure


Current Visit: Yes   Status: Acute   


Plan to address problem: 


1. NEUROLOGY CONSULT


 2. SEIZURE PRECAUTIONS


 3. I.V ATIVAN PRN SEIZURE


 4. SEIZURE MEDICATION TO BE CONTINUED


 








(3) Right sided weakness


Current Visit: Yes   Status: Acute   


Plan to address problem: 


1. NEUROLOGY CONSULT


 2. PHYSICAL THERAPY








(4) Speech disturbance


Current Visit: Yes   Status: Acute   


Plan to address problem: 


1. NPO UNTIL SWALLOW TEST PASSED


 2. NEUROLOGY CONSULT


 3. SPEECH THERAPY CONSULT





3/25/2021


-I have seen and evaluated the patient, patient said he is feeling okay.  

Patient was alert and communicative but slow to respond, that is his baseline 

based on his wife.  No seizure after admission.  Patient was evaluated by his 

therapy. 


Neurology consulted and recommend MRI with and without contrast, increase Keppra

to 1000mg BID but was not ordered.  EEG ordered


Increase the Keppra, waiting neurology if he still needs MRI.


Patient was evaluated by physical therapy and recommend acute rehab.





3/26/2021


-Was seen by neurology and recommend MRI, suspected stroke


-I put the patient on Keppra 1000 mg twice daily, atorvastatin and statin


-Patient was evaluated by physical therapy and recommend acute rehab, case 

management is aware and working on it.


-His speech is currently at baseline.


-Patient trying to get out of the bed and patient is on restraints





3/27/2021


-MRI of head was done and negative for acute normalities.  Echo, CTA head, neck 

were unremarkable.  No seizure after admission.


-Patient was evaluated by PT OT and recommend subacute rehab.  Discussed with 

patient's wife about the management plan in detail.  And was in agreement with 

subacute rehab.  Discussed with case management.





3/28/21; CVA ruled out.  Patient has delirium; on supportive care.  Patient is 

pending for rehab placement.





History


Interval history: 





Patient was seen and evaluated this morning


Patient didn't have seizure after admission


Patient was communicative but slow to speak, which is his baseline according to 

his wife


Patient was trying to get out of bed and have to be on restraints





Hospitalist Physical





- Physical exam


Narrative exam: 





 Not in cardiopulmonary distress. 


 The patient appeared well nourished and normally developed.


 Vital signs as documented.


 Head exam is unremarkable.


 No scleral icterus .


 Neck is without jugular venous distension, thyromegaly, or carotid bruits. 


 Lungs are clear to auscultation.


Cardiac exam reveals regular rate and  Rhythm. 


Abdominal exam reveals normal bowel sounds, nontender, no organomegaly.


Extremities are nonedematous and both femoral and pedal pulses are normal.


CNS: Alert .  Right-sided hemiparesis.  Slow to respond thus his baseline





- Constitutional


Vitals: 


                                        











Temp Pulse Resp BP Pulse Ox


 


 98.1 F   94 H  17   115/78   99 


 


 03/28/21 07:38  03/28/21 07:38  03/28/21 07:38  03/28/21 07:38  03/28/21 07:40











General appearance: Present: no acute distress





HEART Score





- HEART Score


Risk factors: 1-2 risk factors


Troponin: 


                                        











Troponin T  < 0.010 ng/mL (0.00-0.029)   03/23/21  22:39    











Troponin: < normal limit





- Critical Actions


Critical Actions: 0-3 pts:0.9-1.7%risk of adverse cardiac event.Candidate for 

discharge





Results





- Labs


CBC & Chem 7: 


                                 03/23/21 22:39





                                 03/27/21 04:46


Labs: 


                             Laboratory Last Values











WBC  8.5 K/mm3 (4.5-11.0)   03/23/21  22:39    


 


RBC  4.11 M/mm3 (3.65-5.03)   03/23/21  22:39    


 


Hgb  13.8 gm/dl (11.8-15.2)   03/23/21  22:39    


 


Hct  40.1 % (35.5-45.6)   03/23/21  22:39    


 


MCV  98 fl (84-94)  H  03/23/21  22:39    


 


MCH  34 pg (28-32)  H  03/23/21  22:39    


 


MCHC  34 % (32-34)   03/23/21  22:39    


 


RDW  13.0 % (13.2-15.2)  L  03/23/21  22:39    


 


Plt Count  131 K/mm3 (140-440)  L  03/23/21  22:39    


 


Baso % (Auto)  Np   03/23/21  22:39    


 


Add Manual Diff  Complete   03/23/21  22:39    


 


Total Counted  100   03/23/21  22:39    


 


Seg Neuts % (Manual)  68.0 % (40.0-70.0)   03/23/21  22:39    


 


Lymphocytes % (Manual)  20.0 % (13.4-35.0)   03/23/21  22:39    


 


Monocytes % (Manual)  10.0 % (0.0-7.3)  H  03/23/21  22:39    


 


Eosinophils % (Manual)  1.0 % (0.0-4.3)   03/23/21  22:39    


 


Basophils % (Manual)  1.0 % (0.0-1.8)   03/23/21  22:39    


 


Nucleated RBC %  Not Reportable   03/23/21  22:39    


 


Seg Neutrophils # Man  5.8 K/mm3 (1.8-7.7)   03/23/21  22:39    


 


Band Neutrophils #  0.0 K/mm3  03/23/21  22:39    


 


Lymphocytes # (Manual)  1.7 K/mm3 (1.2-5.4)   03/23/21  22:39    


 


Abs React Lymphs (Man)  0.0 K/mm3  03/23/21  22:39    


 


Monocytes # (Manual)  0.9 K/mm3 (0.0-0.8)  H  03/23/21  22:39    


 


Eosinophils # (Manual)  0.1 K/mm3 (0.0-0.4)   03/23/21  22:39    


 


Basophils # (Manual)  0.1 K/mm3 (0.0-0.1)   03/23/21  22:39    


 


Metamyelocytes #  0.0 K/mm3  03/23/21  22:39    


 


Myelocytes #  0.0 K/mm3  03/23/21  22:39    


 


Promyelocytes #  0.0 K/mm3  03/23/21  22:39    


 


Blast Cells #  0.0 K/mm3  03/23/21  22:39    


 


WBC Morphology  Not Reportable   03/23/21  22:39    


 


Hypersegmented Neuts  Not Reportable   03/23/21  22:39    


 


Hyposegmented Neuts  Not Reportable   03/23/21  22:39    


 


Hypogranular Neuts  Not Reportable   03/23/21  22:39    


 


Smudge Cells  Not Reportable   03/23/21  22:39    


 


Toxic Granulation  Not Reportable   03/23/21  22:39    


 


Toxic Vacuolation  Not Reportable   03/23/21  22:39    


 


Dohle Bodies  Not Reportable   03/23/21  22:39    


 


Pelger-Huet Anomaly  Not Reportable   03/23/21  22:39    


 


Kasia Rods  Not Reportable   03/23/21  22:39    


 


Platelet Estimate  Not Reportable   03/23/21  22:39    


 


Clumped Platelets  Not Reportable   03/23/21  22:39    


 


Plt Clumps, EDTA  Not Reportable   03/23/21  22:39    


 


Large Platelets  Not Reportable   03/23/21  22:39    


 


Giant Platelets  Not Reportable   03/23/21  22:39    


 


Platelet Satelliting  Not Reportable   03/23/21  22:39    


 


Plt Morphology Comment  Not Reportable   03/23/21  22:39    


 


RBC Morphology  Normal   03/23/21  22:39    


 


Dimorphic RBCs  Not Reportable   03/23/21  22:39    


 


Polychromasia  Not Reportable   03/23/21  22:39    


 


Hypochromasia  Not Reportable   03/23/21  22:39    


 


Poikilocytosis  Not Reportable   03/23/21  22:39    


 


Anisocytosis  Not Reportable   03/23/21  22:39    


 


Microcytosis  Not Reportable   03/23/21  22:39    


 


Macrocytosis  Not Reportable   03/23/21  22:39    


 


Spherocytes  Not Reportable   03/23/21  22:39    


 


Pappenheimer Bodies  Not Reportable   03/23/21  22:39    


 


Sickle Cells  Not Reportable   03/23/21  22:39    


 


Target Cells  Not Reportable   03/23/21  22:39    


 


Tear Drop Cells  Not Reportable   03/23/21  22:39    


 


Ovalocytes  Not Reportable   03/23/21  22:39    


 


Helmet Cells  Not Reportable   03/23/21  22:39    


 


[place] Bodies  Not Reportable   03/23/21  22:39    


 


Cabot Rings  Not Reportable   03/23/21  22:39    


 


[place] Cells  Not Reportable   03/23/21  22:39    


 


Bite Cells  Not Reportable   03/23/21  22:39    


 


Crenated Cell  Not Reportable   03/23/21  22:39    


 


Elliptocytes  Not Reportable   03/23/21  22:39    


 


Acanthocytes (Spur)  Not Reportable   03/23/21  22:39    


 


Rouleaux  Not Reportable   03/23/21  22:39    


 


Hemoglobin C Crystals  Not Reportable   03/23/21  22:39    


 


Schistocytes  Not Reportable   03/23/21  22:39    


 


Malaria parasites  Not Reportable   03/23/21  22:39    


 


True Bodies  Not Reportable   03/23/21  22:39    


 


Hem Pathologist Commnt  No   03/23/21  22:39    


 


PT  13.0 Sec. (12.2-14.9)   03/23/21  22:39    


 


INR  1.00  (0.87-1.13)   03/23/21  22:39    


 


APTT  24.7 Sec. (24.2-36.6)   03/23/21  22:39    


 


Thrombin Time  16.6 Sec. (15.1-19.6)   03/23/21  22:39    


 


Sodium  140 mmol/L (137-145)   03/27/21  04:46    


 


Potassium  3.6 mmol/L (3.6-5.0)   03/27/21  04:46    


 


Chloride  104.3 mmol/L ()   03/27/21  04:46    


 


Carbon Dioxide  25 mmol/L (22-30)   03/27/21  04:46    


 


Anion Gap  14 mmol/L  03/27/21  04:46    


 


BUN  20 mg/dL (9-20)   03/27/21  04:46    


 


Creatinine  0.8 mg/dL (0.8-1.3)   03/27/21  04:46    


 


Estimated GFR  > 60 ml/min  03/27/21  04:46    


 


BUN/Creatinine Ratio  25 %  03/27/21  04:46    


 


Glucose  85 mg/dL ()   03/27/21  04:46    


 


POC Glucose  107 mg/dL ()  H  03/24/21  20:00    


 


Calcium  9.1 mg/dL (8.4-10.2)   03/27/21  04:46    


 


Magnesium  2.00 mg/dL (1.7-2.3)   03/23/21  22:39    


 


Total Bilirubin  1.00 mg/dL (0.1-1.2)   03/23/21  22:39    


 


AST  24 units/L (5-40)   03/23/21  22:39    


 


ALT  14 units/L (7-56)   03/23/21  22:39    


 


Alkaline Phosphatase  74 units/L ()   03/23/21  22:39    


 


Total Creatine Kinase  68 units/L ()   03/23/21  22:39    


 


CK-MB (CK-2)  < 1.0 ng/mL (0.0-4.0)   03/23/21  22:39    


 


CK-MB (CK-2) Rel Index  1.4  (0-4)   03/23/21  22:39    


 


Troponin T  < 0.010 ng/mL (0.00-0.029)   03/23/21  22:39    


 


Total Protein  6.8 g/dL (6.3-8.2)   03/23/21  22:39    


 


Albumin  4.5 g/dL (3.9-5)   03/23/21  22:39    


 


Albumin/Globulin Ratio  2.0 %  03/23/21  22:39    


 


Triglycerides  67 mg/dL (2-149)   03/25/21  18:53    


 


Cholesterol  131 mg/dL ()   03/25/21  18:53    


 


LDL Cholesterol Direct  81 mg/dL ()   03/25/21  18:53    


 


HDL Cholesterol  48 mg/dL (40-59)   03/25/21  18:53    


 


Cholesterol/HDL Ratio  2.72 %  03/25/21  18:53    


 


Urine Color  Yellow  (Yellow)   03/23/21  Unknown


 


Urine Turbidity  Clear  (Clear)   03/23/21  Unknown


 


Urine pH  6.0  (5.0-7.0)   03/23/21  Unknown


 


Ur Specific Gravity  > 1.059  (1.003-1.030)  H  03/23/21  Unknown


 


Urine Protein  <15 mg/dl mg/dL (Negative)   03/23/21  Unknown


 


Urine Glucose (UA)  Neg mg/dL (Negative)   03/23/21  Unknown


 


Urine Ketones  Neg mg/dL (Negative)   03/23/21  Unknown


 


Urine Blood  Neg  (Negative)   03/23/21  Unknown


 


Urine Nitrite  Neg  (Negative)   03/23/21  Unknown


 


Urine Bilirubin  Neg  (Negative)   03/23/21  Unknown


 


Urine Urobilinogen  < 2.0 mg/dL (<2.0)   03/23/21  Unknown


 


Ur Leukocyte Esterase  Neg  (Negative)   03/23/21  Unknown


 


Urine WBC (Auto)  < 1.0 /HPF (0.0-6.0)   03/23/21  Unknown


 


Urine RBC (Auto)  1.0 /HPF (0.0-6.0)   03/23/21  Unknown


 


U Epithel Cells (Auto)  1.0 /HPF (0-13.0)   03/23/21  Unknown


 


Urine Mucus  Few /HPF  03/23/21  Unknown


 


Urine Opiates Screen  Presumptive negative   03/23/21  Unknown


 


Urine Methadone Screen  Presumptive negative   03/23/21  Unknown


 


Ur Barbiturates Screen  Presumptive negative   03/23/21  Unknown


 


Ur Phencyclidine Scrn  Presumptive negative   03/23/21  Unknown


 


Ur Amphetamines Screen  Presumptive negative   03/23/21  Unknown


 


U Benzodiazepines Scrn  Presumptive negative   03/23/21  Unknown


 


Urine Cocaine Screen  Presumptive negative   03/23/21  Unknown


 


U Marijuana (THC) Screen  Presumptive negative   03/23/21  Unknown


 


Drugs of Abuse Note  Disclamer   03/23/21  Unknown


 


Plasma/Serum Alcohol  < 0.01 % (0-0.07)   03/23/21  22:39    


 


Coronavirus (PCR)  Negative  (Negative)   03/26/21  Unknown











Gamble/IV: 


                                        





Voiding Method                   Urinal











Active Medications





- Current Medications


Current Medications: 














Generic Name Dose Route Start Last Admin





  Trade Name Freq  PRN Reason Stop Dose Admin


 


Acetaminophen  650 mg  03/24/21 02:02  03/27/21 16:07





  Acetaminophen 325 Mg Tab  PO   650 mg





  Q4H PRN   Administration





  Headache  


 


Aspirin  325 mg  03/24/21 10:00  03/27/21 09:19





  Aspirin 325 Mg Tab  PO   325 mg





  QDAY FAIZA   Administration


 


Atorvastatin Calcium  40 mg  03/25/21 22:00  03/27/21 21:32





  Atorvastatin 40 Mg Tab  PO   40 mg





  QHS FAIZA   Administration


 


Levetiracetam  1,000 mg  03/25/21 10:00  03/27/21 21:32





  Levetiracetam 500 Mg Tab  PO   1,000 mg





  BID FAIZA   Administration


 


Lorazepam  1 mg  03/24/21 08:30  03/26/21 11:51





  Lorazepam 2 Mg/Ml Vial  IV   1 mg





  Q1H PRN   Administration





  Seizures  


 


Ondansetron HCl  4 mg  03/24/21 02:03 





  Ondansetron 4 Mg/2 Ml Inj  IV  





  Q8H PRN  





  Nausea And Vomiting

## 2021-03-29 RX ADMIN — MIRTAZAPINE SCH MG: 15 TABLET, FILM COATED ORAL at 22:42

## 2021-03-29 RX ADMIN — ASPIRIN SCH MG: 325 TABLET ORAL at 09:08

## 2021-03-29 RX ADMIN — HALOPERIDOL SCH MG: 1 TABLET ORAL at 22:43

## 2021-03-29 RX ADMIN — CITALOPRAM HYDROBROMIDE SCH: 10 TABLET ORAL at 09:33

## 2021-03-29 RX ADMIN — HALOPERIDOL SCH: 1 TABLET ORAL at 11:40

## 2021-03-29 RX ADMIN — HALOPERIDOL SCH MG: 1 TABLET ORAL at 13:14

## 2021-03-29 NOTE — EVENT NOTE
Date: 03/29/21


Spoke with the patient's spouse, she says the behaviors exhibited are the ones 

he's had since around February. She says he does seem afraid all the time, but 

hasn't had any psych issues. She says he seems to get like this when he's in an 

unfamiliar environment. She says she had stopped given him the celexa and he was

acting better. She says he's not been taking the celexa but okay with him 

staying on it. Also discussed haldol and remeron to see if it would improve the 

patient's sleep, fear and paranoia. The patient's spouse agrees.

## 2021-03-29 NOTE — PROGRESS NOTE
Assessment and Plan


Assessment and plan: 


Patient is a 51-year-old male with past medical history of seizure disorder, 

cerebrovascular accident with right-sided weakness and speech impairment 

presenting with altered mental status.  Patient is noncommunicative and was 

alone during evaluation with no body history gave account of what happened.  Hi

story is based on information from the emergency room and patient's medical 

record. There is worsening speech impairment , generalized weakness, and shaking

sensation of the body at home prior to presentation. 





3/29


Patient seen and examined, complains to say Yes to every questions, wife states 

that this happens at home, with the blank stare and says Yes to every questions.

Per the spouse all this started following Feb 23rd, TIA and seizure, following 

that March 23 on presenting here, concerning for seizure. No known family hx of 

seizure per his spouse. She states that the week before Feb 23RD He was stuck in

[place] due to weather as he was a , and when he came back home he was

scared. Since then he has not been the same. 





March 17TH


Drags right leg














Acute On Chronic Metabolic Encephalopathy


CVA ruled OUT


Possible Seizure Disorder- POA








(1) Altered mental status


Current Visit: Yes   Status: Acute   


Plan to address problem: 


1. NEUROLOGY CONSULT





Delirium


-Supportive care





(2) History of seizure


Current Visit: Yes   Status: Acute   


Plan to address problem: 


1. NEUROLOGY CONSULT


 2. SEIZURE PRECAUTIONS


 3. I.V ATIVAN PRN SEIZURE


 4. SEIZURE MEDICATION TO BE CONTINUED


 








(3) Right sided weakness


Current Visit: Yes   Status: Acute   


Plan to address problem: 


1. NEUROLOGY CONSULT


 2. PHYSICAL THERAPY








(4) Speech disturbance


Current Visit: Yes   Status: Acute   


Plan to address problem: 


1. NPO UNTIL SWALLOW TEST PASSED


 2. NEUROLOGY CONSULT


 3. SPEECH THERAPY CONSULT





3/25/2021


-I have seen and evaluated the patient, patient said he is feeling okay.  

Patient was alert and communicative but slow to respond, that is his baseline 

based on his wife.  No seizure after admission.  Patient was evaluated by his 

therapy. 


Neurology consulted and recommend MRI with and without contrast, increase Keppra

to 1000mg BID but was not ordered.  EEG ordered


Increase the Keppra, waiting neurology if he still needs MRI.


Patient was evaluated by physical therapy and recommend acute rehab.





3/26/2021


-Was seen by neurology and recommend MRI, suspected stroke


-I put the patient on Keppra 1000 mg twice daily, atorvastatin and statin


-Patient was evaluated by physical therapy and recommend acute rehab, case 

management is aware and working on it.


-His speech is currently at baseline.


-Patient trying to get out of the bed and patient is on restraints





3/27/2021


-MRI of head was done and negative for acute normalities.  Echo, CTA head, neck 

were unremarkable.  No seizure after admission.


-Patient was evaluated by PT OT and recommend subacute rehab.  Discussed with 

patient's wife about the management plan in detail.  And was in agreement with 

subacute rehab.  Discussed with case management.





3/28/21; CVA ruled out.  Patient has delirium; on supportive care.  Patient is 

pending for rehab placement.











History


Interval history: 


Patient seen and examined, no new complaints. 








Hospitalist Physical





- Physical exam


Narrative exam: 


Not in cardiopulmonary distress. 


 The patient appeared well nourished and normally developed.


 Vital signs as documented.


 Head exam is unremarkable.


 No scleral icterus .


 Neck is without jugular venous distension, thyromegaly, or carotid bruits. 


 Lungs are clear to auscultation.


Cardiac exam reveals regular rate and  Rhythm. 


Abdominal exam reveals normal bowel sounds, nontender, no organomegaly.


Extremities are nonedematous and both femoral and pedal pulses are normal.


CNS: Alert .  Right-sided hemiparesis.  Slow to respond thus his baseline








- Constitutional


Vitals: 


                                        











Temp Pulse Resp BP Pulse Ox


 


 98.5 F   88   18   133/88   98 


 


 03/29/21 08:31  03/29/21 08:31  03/29/21 08:31  03/29/21 08:31  03/29/21 08:31











General appearance: Present: no acute distress





HEART Score





- HEART Score


Risk factors: 1-2 risk factors


Troponin: 


                                        











Troponin T  < 0.010 ng/mL (0.00-0.029)   03/23/21  22:39    











Troponin: < normal limit





- Critical Actions


Critical Actions: 0-3 pts:0.9-1.7%risk of adverse cardiac event.Candidate for 

discharge





Results





- Labs


CBC & Chem 7: 


                                 03/23/21 22:39





                                 03/27/21 04:46


Labs: 


                             Laboratory Last Values











WBC  8.5 K/mm3 (4.5-11.0)   03/23/21  22:39    


 


RBC  4.11 M/mm3 (3.65-5.03)   03/23/21  22:39    


 


Hgb  13.8 gm/dl (11.8-15.2)   03/23/21  22:39    


 


Hct  40.1 % (35.5-45.6)   03/23/21  22:39    


 


MCV  98 fl (84-94)  H  03/23/21  22:39    


 


MCH  34 pg (28-32)  H  03/23/21  22:39    


 


MCHC  34 % (32-34)   03/23/21  22:39    


 


RDW  13.0 % (13.2-15.2)  L  03/23/21  22:39    


 


Plt Count  131 K/mm3 (140-440)  L  03/23/21  22:39    


 


Baso % (Auto)  Np   03/23/21  22:39    


 


Add Manual Diff  Complete   03/23/21  22:39    


 


Total Counted  100   03/23/21  22:39    


 


Seg Neuts % (Manual)  68.0 % (40.0-70.0)   03/23/21  22:39    


 


Lymphocytes % (Manual)  20.0 % (13.4-35.0)   03/23/21  22:39    


 


Monocytes % (Manual)  10.0 % (0.0-7.3)  H  03/23/21  22:39    


 


Eosinophils % (Manual)  1.0 % (0.0-4.3)   03/23/21  22:39    


 


Basophils % (Manual)  1.0 % (0.0-1.8)   03/23/21  22:39    


 


Nucleated RBC %  Not Reportable   03/23/21  22:39    


 


Seg Neutrophils # Man  5.8 K/mm3 (1.8-7.7)   03/23/21  22:39    


 


Band Neutrophils #  0.0 K/mm3  03/23/21  22:39    


 


Lymphocytes # (Manual)  1.7 K/mm3 (1.2-5.4)   03/23/21  22:39    


 


Abs React Lymphs (Man)  0.0 K/mm3  03/23/21  22:39    


 


Monocytes # (Manual)  0.9 K/mm3 (0.0-0.8)  H  03/23/21  22:39    


 


Eosinophils # (Manual)  0.1 K/mm3 (0.0-0.4)   03/23/21  22:39    


 


Basophils # (Manual)  0.1 K/mm3 (0.0-0.1)   03/23/21  22:39    


 


Metamyelocytes #  0.0 K/mm3  03/23/21  22:39    


 


Myelocytes #  0.0 K/mm3  03/23/21  22:39    


 


Promyelocytes #  0.0 K/mm3  03/23/21  22:39    


 


Blast Cells #  0.0 K/mm3  03/23/21  22:39    


 


WBC Morphology  Not Reportable   03/23/21  22:39    


 


Hypersegmented Neuts  Not Reportable   03/23/21  22:39    


 


Hyposegmented Neuts  Not Reportable   03/23/21  22:39    


 


Hypogranular Neuts  Not Reportable   03/23/21  22:39    


 


Smudge Cells  Not Reportable   03/23/21  22:39    


 


Toxic Granulation  Not Reportable   03/23/21  22:39    


 


Toxic Vacuolation  Not Reportable   03/23/21  22:39    


 


Dohle Bodies  Not Reportable   03/23/21  22:39    


 


Pelger-Huet Anomaly  Not Reportable   03/23/21  22:39    


 


Kasia Rods  Not Reportable   03/23/21  22:39    


 


Platelet Estimate  Not Reportable   03/23/21  22:39    


 


Clumped Platelets  Not Reportable   03/23/21  22:39    


 


Plt Clumps, EDTA  Not Reportable   03/23/21  22:39    


 


Large Platelets  Not Reportable   03/23/21  22:39    


 


Giant Platelets  Not Reportable   03/23/21  22:39    


 


Platelet Satelliting  Not Reportable   03/23/21  22:39    


 


Plt Morphology Comment  Not Reportable   03/23/21  22:39    


 


RBC Morphology  Normal   03/23/21  22:39    


 


Dimorphic RBCs  Not Reportable   03/23/21  22:39    


 


Polychromasia  Not Reportable   03/23/21  22:39    


 


Hypochromasia  Not Reportable   03/23/21  22:39    


 


Poikilocytosis  Not Reportable   03/23/21  22:39    


 


Anisocytosis  Not Reportable   03/23/21  22:39    


 


Microcytosis  Not Reportable   03/23/21  22:39    


 


Macrocytosis  Not Reportable   03/23/21  22:39    


 


Spherocytes  Not Reportable   03/23/21  22:39    


 


Pappenheimer Bodies  Not Reportable   03/23/21  22:39    


 


Sickle Cells  Not Reportable   03/23/21  22:39    


 


Target Cells  Not Reportable   03/23/21  22:39    


 


Tear Drop Cells  Not Reportable   03/23/21  22:39    


 


Ovalocytes  Not Reportable   03/23/21  22:39    


 


Helmet Cells  Not Reportable   03/23/21  22:39    


 


[place] Bodies  Not Reportable   03/23/21  22:39    


 


Cabot Rings  Not Reportable   03/23/21  22:39    


 


[place] Cells  Not Reportable   03/23/21  22:39    


 


Bite Cells  Not Reportable   03/23/21  22:39    


 


Crenated Cell  Not Reportable   03/23/21  22:39    


 


Elliptocytes  Not Reportable   03/23/21  22:39    


 


Acanthocytes (Spur)  Not Reportable   03/23/21  22:39    


 


Rouleaux  Not Reportable   03/23/21  22:39    


 


Hemoglobin C Crystals  Not Reportable   03/23/21  22:39    


 


Schistocytes  Not Reportable   03/23/21  22:39    


 


Malaria parasites  Not Reportable   03/23/21  22:39    


 


True Bodies  Not Reportable   03/23/21  22:39    


 


Hem Pathologist Commnt  No   03/23/21  22:39    


 


PT  13.0 Sec. (12.2-14.9)   03/23/21  22:39    


 


INR  1.00  (0.87-1.13)   03/23/21  22:39    


 


APTT  24.7 Sec. (24.2-36.6)   03/23/21  22:39    


 


Thrombin Time  16.6 Sec. (15.1-19.6)   03/23/21  22:39    


 


Sodium  140 mmol/L (137-145)   03/27/21  04:46    


 


Potassium  3.6 mmol/L (3.6-5.0)   03/27/21  04:46    


 


Chloride  104.3 mmol/L ()   03/27/21  04:46    


 


Carbon Dioxide  25 mmol/L (22-30)   03/27/21  04:46    


 


Anion Gap  14 mmol/L  03/27/21  04:46    


 


BUN  20 mg/dL (9-20)   03/27/21  04:46    


 


Creatinine  0.8 mg/dL (0.8-1.3)   03/27/21  04:46    


 


Estimated GFR  > 60 ml/min  03/27/21  04:46    


 


BUN/Creatinine Ratio  25 %  03/27/21  04:46    


 


Glucose  85 mg/dL ()   03/27/21  04:46    


 


POC Glucose  107 mg/dL ()  H  03/24/21  20:00    


 


Calcium  9.1 mg/dL (8.4-10.2)   03/27/21  04:46    


 


Magnesium  2.00 mg/dL (1.7-2.3)   03/23/21  22:39    


 


Total Bilirubin  1.00 mg/dL (0.1-1.2)   03/23/21  22:39    


 


AST  24 units/L (5-40)   03/23/21  22:39    


 


ALT  14 units/L (7-56)   03/23/21  22:39    


 


Alkaline Phosphatase  74 units/L ()   03/23/21  22:39    


 


Total Creatine Kinase  68 units/L ()   03/23/21  22:39    


 


CK-MB (CK-2)  < 1.0 ng/mL (0.0-4.0)   03/23/21  22:39    


 


CK-MB (CK-2) Rel Index  1.4  (0-4)   03/23/21  22:39    


 


Troponin T  < 0.010 ng/mL (0.00-0.029)   03/23/21  22:39    


 


Total Protein  6.8 g/dL (6.3-8.2)   03/23/21  22:39    


 


Albumin  4.5 g/dL (3.9-5)   03/23/21  22:39    


 


Albumin/Globulin Ratio  2.0 %  03/23/21  22:39    


 


Triglycerides  67 mg/dL (2-149)   03/25/21  18:53    


 


Cholesterol  131 mg/dL ()   03/25/21  18:53    


 


LDL Cholesterol Direct  81 mg/dL ()   03/25/21  18:53    


 


HDL Cholesterol  48 mg/dL (40-59)   03/25/21  18:53    


 


Cholesterol/HDL Ratio  2.72 %  03/25/21  18:53    


 


Urine Color  Yellow  (Yellow)   03/23/21  Unknown


 


Urine Turbidity  Clear  (Clear)   03/23/21  Unknown


 


Urine pH  6.0  (5.0-7.0)   03/23/21  Unknown


 


Ur Specific Gravity  > 1.059  (1.003-1.030)  H  03/23/21  Unknown


 


Urine Protein  <15 mg/dl mg/dL (Negative)   03/23/21  Unknown


 


Urine Glucose (UA)  Neg mg/dL (Negative)   03/23/21  Unknown


 


Urine Ketones  Neg mg/dL (Negative)   03/23/21  Unknown


 


Urine Blood  Neg  (Negative)   03/23/21  Unknown


 


Urine Nitrite  Neg  (Negative)   03/23/21  Unknown


 


Urine Bilirubin  Neg  (Negative)   03/23/21  Unknown


 


Urine Urobilinogen  < 2.0 mg/dL (<2.0)   03/23/21  Unknown


 


Ur Leukocyte Esterase  Neg  (Negative)   03/23/21  Unknown


 


Urine WBC (Auto)  < 1.0 /HPF (0.0-6.0)   03/23/21  Unknown


 


Urine RBC (Auto)  1.0 /HPF (0.0-6.0)   03/23/21  Unknown


 


U Epithel Cells (Auto)  1.0 /HPF (0-13.0)   03/23/21  Unknown


 


Urine Mucus  Few /HPF  03/23/21  Unknown


 


Urine Opiates Screen  Presumptive negative   03/23/21  Unknown


 


Urine Methadone Screen  Presumptive negative   03/23/21  Unknown


 


Ur Barbiturates Screen  Presumptive negative   03/23/21  Unknown


 


Ur Phencyclidine Scrn  Presumptive negative   03/23/21  Unknown


 


Ur Amphetamines Screen  Presumptive negative   03/23/21  Unknown


 


U Benzodiazepines Scrn  Presumptive negative   03/23/21  Unknown


 


Urine Cocaine Screen  Presumptive negative   03/23/21  Unknown


 


U Marijuana (THC) Screen  Presumptive negative   03/23/21  Unknown


 


Drugs of Abuse Note  Disclamer   03/23/21  Unknown


 


Plasma/Serum Alcohol  < 0.01 % (0-0.07)   03/23/21  22:39    


 


Coronavirus (PCR)  Negative  (Negative)   03/26/21  Unknown











Gamble/IV: 


                                        





Voiding Method                   Urinal











Active Medications





- Current Medications


Current Medications: 














Generic Name Dose Route Start Last Admin





  Trade Name Freq  PRN Reason Stop Dose Admin


 


Acetaminophen  650 mg  03/24/21 02:02  03/27/21 16:07





  Acetaminophen 325 Mg Tab  PO   650 mg





  Q4H PRN   Administration





  Headache  


 


Aspirin  325 mg  03/24/21 10:00  03/29/21 09:08





  Aspirin 325 Mg Tab  PO   325 mg





  QDAY FAIZA   Administration


 


Atorvastatin Calcium  40 mg  03/25/21 22:00  03/28/21 21:21





  Atorvastatin 40 Mg Tab  PO   40 mg





  QHS FAIZA   Administration


 


Citalopram Hydrobromide  10 mg  03/29/21 10:00 





  Citalopram 10 Mg Tab  PO  





  DAILY FAIZA  


 


Escitalopram Oxalate  10 mg  03/29/21 10:00  03/29/21 09:07





  Escitalopram 10 Mg Tab  PO   10 mg





  DAILY FAIZA   Administration


 


Levetiracetam  1,000 mg  03/25/21 10:00  03/29/21 09:08





  Levetiracetam 500 Mg Tab  PO   1,000 mg





  BID FAIZA   Administration


 


Lorazepam  1 mg  03/24/21 08:30  03/26/21 11:51





  Lorazepam 2 Mg/Ml Vial  IV   1 mg





  Q1H PRN   Administration





  Seizures  


 


Ondansetron HCl  4 mg  03/24/21 02:03 





  Ondansetron 4 Mg/2 Ml Inj  IV  





  Q8H PRN  





  Nausea And Vomiting

## 2021-03-29 NOTE — CONSULTATION
History of Present Illness





- Reason for Consult


Consult date: 03/29/21


Reason for consult: AMS





- History of Present Psychiatric Illness


Per ER Note: The patient is a 50-year-old gentleman.  He is not known to myself 

previously.  He may have a history of stroke and/or seizure.  It is unclear what

his prior diagnoses are.  The patient is brought to the hospital by emergency 

medical services.  He is awake, follows commands, but confused.  He only 

responds "yes."





Nursing team informs me that the patient is brought to the hospital for shaking 

activity, confusion, change in speech pattern, and right arm shaking.  His last 

known well time is not known.





I attempted to interview this patient, he is sitting up in bed. He has expre

ssive aphasia. He is unable to give me any information. When I tell him his 

name, he repeats his name to every question asked. The patient does appear to be

having some psychosis as it is hard to tell at this point. He is looking around 

the room and in the chair as if looking for someone. He confused, and appears 

afraid. He jumps when I cough and I am at a distance from him. His eyes are 

bucked and he's staring at me intensely. When leaving the room, I turn to look 

at him. He jumps again.





PAST PSYCHIATRIC HISTORY:


Unable to assess





PAST MEDICAL HISTORY: Unable to assess





Family Psychiatric History: None reported or documented





SOCIAL HISTORY


Unable to assess





REVIEW OF SYSTEMS


Unable to assess





MENTAL STATUS EXAMINATION


Unable to assess





 Assessment and Plan 


(1) Altered Mental Status


Current Visit: Yes   Status: Acute





 Treatment


Start Haldol 0.5mg po BID


Start Remeron 7.5mg po qhs


Agree with Celexa 10mg po daily


Sitter: Defer to primary


Medical: Per primary


Disposition: Do not recommend acute psychiatric inpatient at this time. This may

change once the patient is medically clear. Will treat and monitor psych 

progress.


Will follow. Thank you for this consult.


Case staffed with Dr. Whitten  





Medications and Allergies


                                    Allergies











Allergy/AdvReac Type Severity Reaction Status Date / Time


 


No Known Allergies Allergy   Unverified 03/24/21 00:16











                                Home Medications











 Medication  Instructions  Recorded  Confirmed  Last Taken  Type


 


Aspirin [Adult Aspirin] 81 mg PO QDAY 03/24/21 03/24/21 Unknown History


 


Atorvastatin Calcium [Lipitor] 80 mg PO QDAY 03/24/21 03/24/21 Unknown History


 


Escitalopram [Lexapro] 10 mg PO DAILY 03/24/21 03/24/21 Unknown History


 


Lisinopril/Hydrochlorothiazide 1 each PO QDAY 03/24/21 03/24/21 Unknown History





[Zestoretic 10-12.5 mg Tablet]     


 


levETIRAcetam [Keppra TAB] 750 mg PO BID 03/24/21 03/24/21 Unknown History


 


Citalopram [celeXA] 10 mg PO DAILY 03/26/21 03/26/21 Unknown History











Active Meds: 


Active Medications





Acetaminophen (Acetaminophen 325 Mg Tab)  650 mg PO Q4H PRN


   PRN Reason: Headache


   Last Admin: 03/27/21 16:07 Dose:  650 mg


   Documented by: 


Aspirin (Aspirin 325 Mg Tab)  325 mg PO QDAY [facility]


   Last Admin: 03/29/21 09:08 Dose:  325 mg


   Documented by: 


Atorvastatin Calcium (Atorvastatin 40 Mg Tab)  40 mg PO QHS [facility]


   Last Admin: 03/28/21 21:21 Dose:  40 mg


   Documented by: 


Citalopram Hydrobromide (Citalopram 10 Mg Tab)  10 mg PO DAILY [facility]


   Last Admin: 03/29/21 09:33 Dose:  Not Given


   Documented by: 


Levetiracetam (Levetiracetam 500 Mg Tab)  1,000 mg PO BID [facility]


   Last Admin: 03/29/21 09:08 Dose:  1,000 mg


   Documented by: 


Lorazepam (Lorazepam 2 Mg/Ml Vial)  1 mg IV Q1H PRN


   PRN Reason: Seizures


   Last Admin: 03/26/21 11:51 Dose:  1 mg


   Documented by: 


Ondansetron HCl (Ondansetron 4 Mg/2 Ml Inj)  4 mg IV Q8H PRN


   PRN Reason: Nausea And Vomiting











Mental Status Exam





- Vital signs


                                Last Vital Signs











Temp  98.5 F   03/29/21 08:31


 


Pulse  88   03/29/21 08:31


 


Resp  18   03/29/21 08:31


 


BP  133/88   03/29/21 08:31


 


Pulse Ox  98   03/29/21 08:31














Results


Result Diagrams: 


                                 03/23/21 22:39





                                 03/27/21 04:46


All other labs normal.

## 2021-03-30 LAB
BASOPHILS # (AUTO): 0.1 K/MM3 (ref 0–0.1)
BASOPHILS NFR BLD AUTO: 0.7 % (ref 0–1.8)
BUN SERPL-MCNC: 22 MG/DL (ref 9–20)
BUN/CREAT SERPL: 22 %
CALCIUM SERPL-MCNC: 9.4 MG/DL (ref 8.4–10.2)
EOSINOPHIL # BLD AUTO: 0 K/MM3 (ref 0–0.4)
EOSINOPHIL NFR BLD AUTO: 0 % (ref 0–4.3)
HCT VFR BLD CALC: 43.1 % (ref 35.5–45.6)
HEMOLYSIS INDEX: 5
HGB BLD-MCNC: 14.7 GM/DL (ref 11.8–15.2)
LYMPHOCYTES # BLD AUTO: 2.5 K/MM3 (ref 1.2–5.4)
LYMPHOCYTES NFR BLD AUTO: 21.4 % (ref 13.4–35)
MCHC RBC AUTO-ENTMCNC: 34 % (ref 32–34)
MCV RBC AUTO: 97 FL (ref 84–94)
MONOCYTES # (AUTO): 1.1 K/MM3 (ref 0–0.8)
MONOCYTES % (AUTO): 9.2 % (ref 0–7.3)
PLATELET # BLD: 154 K/MM3 (ref 140–440)
RBC # BLD AUTO: 4.44 M/MM3 (ref 3.65–5.03)

## 2021-03-30 RX ADMIN — HALOPERIDOL SCH MG: 1 TABLET ORAL at 09:43

## 2021-03-30 RX ADMIN — THIAMINE HYDROCHLORIDE SCH MLS/HR: 100 INJECTION, SOLUTION INTRAMUSCULAR; INTRAVENOUS at 14:49

## 2021-03-30 RX ADMIN — HALOPERIDOL SCH MG: 1 TABLET ORAL at 21:27

## 2021-03-30 RX ADMIN — MIRTAZAPINE SCH MG: 15 TABLET, FILM COATED ORAL at 21:27

## 2021-03-30 RX ADMIN — MULTIVITAMIN TABLET SCH EACH: TABLET at 14:49

## 2021-03-30 RX ADMIN — FOLIC ACID SCH MG: 1 TABLET ORAL at 14:49

## 2021-03-30 RX ADMIN — CITALOPRAM HYDROBROMIDE SCH MG: 10 TABLET ORAL at 09:40

## 2021-03-30 RX ADMIN — ASPIRIN SCH MG: 325 TABLET ORAL at 09:44
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
## 2021-03-30 NOTE — PROGRESS NOTE
Subjective





- Reason for Consult


Consult date: 03/30/21


Reason for consult: AMS





- Chief Complaint


Chief complaint: 


Per Hospitalist Note: Patient is a 51-year-old male with past medical history of

seizure disorder, cerebrovascular accident with right-sided weakness and speech 

impairment presenting with altered mental status.  Patient is noncommunicative 

and was alone during evaluation with no body history gave account of what 

happened.  History is based on information from the emergency room and patient's

medical record. There is worsening speech impairment , generalized weakness, and

shaking sensation of the body at home prior to presentation. 





Per Nurse Note: Speech therapist at bedside with patient. pt . is alert but 

disoriented, expressive aphagia.





The patient was seen today, he is sitting up in the bed. He is disoriented and 

unable to communicate effectively. He speaks one words, and repeated what I said

in one words. He smiles and looks around as I'm speaking to him. 





REVIEW OF SYSTEMS


Unable to assess





MENTAL STATUS EXAMINATION


Unable to assess





 Assessment and Plan 


(1) Delirium


Current Visit: Yes   Status: Acute





 Treatment


Increased Haldol 1mg po BID


Continue Remeron 7.5mg po qhs


Increased Celexa 20mg po daily


Sitter: Defer to primary


Medical: Per primary


Disposition: Do not recommend acute psychiatric inpatient at this time. This may

change once the patient is medically clear. Will treat and monitor psych 

progress.


Will follow. Thank you for this consult.


Case staffed with Dr. Whitten  





Mental Status Exam





- Vital signs


                                Last Vital Signs











Temp  97.8 F   03/30/21 07:58


 


Pulse  79   03/30/21 07:58


 


Resp  16   03/30/21 07:58


 


BP  128/87   03/30/21 07:58


 


Pulse Ox  97   03/30/21 07:58

## 2021-03-31 VITALS — SYSTOLIC BLOOD PRESSURE: 136 MMHG | DIASTOLIC BLOOD PRESSURE: 89 MMHG

## 2021-03-31 RX ADMIN — FOLIC ACID SCH MG: 1 TABLET ORAL at 10:37

## 2021-03-31 RX ADMIN — ASPIRIN SCH MG: 325 TABLET ORAL at 10:37

## 2021-03-31 RX ADMIN — MULTIVITAMIN TABLET SCH EACH: TABLET at 10:37

## 2021-03-31 RX ADMIN — HALOPERIDOL SCH MG: 1 TABLET ORAL at 10:37

## 2021-03-31 RX ADMIN — THIAMINE HYDROCHLORIDE SCH MLS/HR: 100 INJECTION, SOLUTION INTRAMUSCULAR; INTRAVENOUS at 10:38
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
## 2021-04-15 ENCOUNTER — HOSPITAL ENCOUNTER (INPATIENT)
Dept: HOSPITAL 5 - ED | Age: 51
LOS: 9 days | Discharge: NURSING, REHAB OR LONG-TERM CARE | DRG: 640 | End: 2021-04-24
Attending: INTERNAL MEDICINE | Admitting: INTERNAL MEDICINE
Payer: COMMERCIAL

## 2021-04-15 DIAGNOSIS — Z79.82: ICD-10-CM

## 2021-04-15 DIAGNOSIS — Z20.822: ICD-10-CM

## 2021-04-15 DIAGNOSIS — R56.9: ICD-10-CM

## 2021-04-15 DIAGNOSIS — Z86.73: ICD-10-CM

## 2021-04-15 DIAGNOSIS — F32.9: ICD-10-CM

## 2021-04-15 DIAGNOSIS — E87.6: ICD-10-CM

## 2021-04-15 DIAGNOSIS — I10: ICD-10-CM

## 2021-04-15 DIAGNOSIS — E86.0: Primary | ICD-10-CM

## 2021-04-15 DIAGNOSIS — F23: ICD-10-CM

## 2021-04-15 DIAGNOSIS — Z79.891: ICD-10-CM

## 2021-04-15 DIAGNOSIS — Z79.01: ICD-10-CM

## 2021-04-15 DIAGNOSIS — F43.10: ICD-10-CM

## 2021-04-15 DIAGNOSIS — Z79.899: ICD-10-CM

## 2021-04-15 DIAGNOSIS — Z87.891: ICD-10-CM

## 2021-04-15 DIAGNOSIS — G93.41: ICD-10-CM

## 2021-04-15 DIAGNOSIS — F06.1: ICD-10-CM

## 2021-04-15 LAB
ALBUMIN SERPL-MCNC: 4.4 G/DL (ref 3.9–5)
ALT SERPL-CCNC: 20 UNITS/L (ref 7–56)
BASOPHILS # (AUTO): 0.1 K/MM3 (ref 0–0.1)
BASOPHILS NFR BLD AUTO: 0.5 % (ref 0–1.8)
BILIRUB UR QL STRIP: (no result)
BLOOD UR QL VISUAL: (no result)
BUN SERPL-MCNC: 34 MG/DL (ref 9–20)
BUN/CREAT SERPL: 34 %
CALCIUM SERPL-MCNC: 9.7 MG/DL (ref 8.4–10.2)
EOSINOPHIL # BLD AUTO: 0.1 K/MM3 (ref 0–0.4)
EOSINOPHIL NFR BLD AUTO: 0.6 % (ref 0–4.3)
HCT VFR BLD CALC: 40.1 % (ref 35.5–45.6)
HEMOLYSIS INDEX: 4
HGB BLD-MCNC: 13.6 GM/DL (ref 11.8–15.2)
LYMPHOCYTES # BLD AUTO: 2 K/MM3 (ref 1.2–5.4)
LYMPHOCYTES NFR BLD AUTO: 19.8 % (ref 13.4–35)
MCHC RBC AUTO-ENTMCNC: 34 % (ref 32–34)
MCV RBC AUTO: 97 FL (ref 84–94)
MONOCYTES # (AUTO): 0.9 K/MM3 (ref 0–0.8)
MONOCYTES % (AUTO): 9.1 % (ref 0–7.3)
MUCOUS THREADS #/AREA URNS HPF: (no result) /HPF
PH UR STRIP: 6 [PH] (ref 5–7)
PLATELET # BLD: 159 K/MM3 (ref 140–440)
RBC # BLD AUTO: 4.12 M/MM3 (ref 3.65–5.03)
RBC #/AREA URNS HPF: < 1 /HPF (ref 0–6)
UROBILINOGEN UR-MCNC: 4 MG/DL (ref ?–2)
WBC #/AREA URNS HPF: 1 /HPF (ref 0–6)

## 2021-04-15 PROCEDURE — 82550 ASSAY OF CK (CPK): CPT

## 2021-04-15 PROCEDURE — 36415 COLL VENOUS BLD VENIPUNCTURE: CPT

## 2021-04-15 PROCEDURE — 96375 TX/PRO/DX INJ NEW DRUG ADDON: CPT

## 2021-04-15 PROCEDURE — 87116 MYCOBACTERIA CULTURE: CPT

## 2021-04-15 PROCEDURE — 62270 DX LMBR SPI PNXR: CPT

## 2021-04-15 PROCEDURE — 85025 COMPLETE CBC W/AUTO DIFF WBC: CPT

## 2021-04-15 PROCEDURE — 82962 GLUCOSE BLOOD TEST: CPT

## 2021-04-15 PROCEDURE — G0378 HOSPITAL OBSERVATION PER HR: HCPCS

## 2021-04-15 PROCEDURE — 84160 ASSAY OF PROTEIN ANY SOURCE: CPT

## 2021-04-15 PROCEDURE — 87498 ENTEROVIRUS PROBE&REVRS TRNS: CPT

## 2021-04-15 PROCEDURE — 82947 ASSAY GLUCOSE BLOOD QUANT: CPT

## 2021-04-15 PROCEDURE — 71045 X-RAY EXAM CHEST 1 VIEW: CPT

## 2021-04-15 PROCEDURE — 83550 IRON BINDING TEST: CPT

## 2021-04-15 PROCEDURE — 81001 URINALYSIS AUTO W/SCOPE: CPT

## 2021-04-15 PROCEDURE — 70450 CT HEAD/BRAIN W/O DYE: CPT

## 2021-04-15 PROCEDURE — 80053 COMPREHEN METABOLIC PANEL: CPT

## 2021-04-15 PROCEDURE — A9575 INJ GADOTERATE MEGLUMI 0.1ML: HCPCS

## 2021-04-15 PROCEDURE — 84443 ASSAY THYROID STIM HORMONE: CPT

## 2021-04-15 PROCEDURE — 86140 C-REACTIVE PROTEIN: CPT

## 2021-04-15 PROCEDURE — 86592 SYPHILIS TEST NON-TREP QUAL: CPT

## 2021-04-15 PROCEDURE — 84145 PROCALCITONIN (PCT): CPT

## 2021-04-15 PROCEDURE — 85730 THROMBOPLASTIN TIME PARTIAL: CPT

## 2021-04-15 PROCEDURE — 85610 PROTHROMBIN TIME: CPT

## 2021-04-15 PROCEDURE — 89051 BODY FLUID CELL COUNT: CPT

## 2021-04-15 PROCEDURE — 95819 EEG AWAKE AND ASLEEP: CPT

## 2021-04-15 PROCEDURE — 70553 MRI BRAIN STEM W/O & W/DYE: CPT

## 2021-04-15 PROCEDURE — 87799 DETECT AGENT NOS DNA QUANT: CPT

## 2021-04-15 PROCEDURE — 96365 THER/PROPH/DIAG IV INF INIT: CPT

## 2021-04-15 PROCEDURE — 82140 ASSAY OF AMMONIA: CPT

## 2021-04-15 PROCEDURE — U0003 INFECTIOUS AGENT DETECTION BY NUCLEIC ACID (DNA OR RNA); SEVERE ACUTE RESPIRATORY SYNDROME CORONAVIRUS 2 (SARS-COV-2) (CORONAVIRUS DISEASE [COVID-19]), AMPLIFIED PROBE TECHNIQUE, MAKING USE OF HIGH THROUGHPUT TECHNOLOGIES AS DESCRIBED BY CMS-2020-01-R: HCPCS

## 2021-04-15 PROCEDURE — 62328 DX LMBR SPI PNXR W/FLUOR/CT: CPT

## 2021-04-15 PROCEDURE — 80048 BASIC METABOLIC PNL TOTAL CA: CPT

## 2021-04-15 NOTE — HISTORY AND PHYSICAL REPORT
History of Present Illness


Date of examination: 04/15/21


Date of admission: 


04/15/21 22:12





Chief complaint: 





Altered Mental Status


History of present illness: 





51-year-old male with known history of acute on chronic metabolic 

encephalopathy, depression, speech impairment, seizure disorder, PTSD was 

brought to the emergency room today with altered mental status.  Patient is a 

resident of [facility] nursing [facility] of healthcare.  Nursing staff at 

the facility were concerned that patient may have had a seizure.  He was given 

IM Ativan prior to arrival in the emergency room.





Records indicates the patient was admitted last month and was diagnosed with 

acute on chronic metabolic encephalopathy and also PTSD.


Patient is a poor historian, most of the history was gotten from the ER 

physician.


Patient is said to be on multiple medications at the skilled facility.





Work-up in the emergency room today reveals elevated BUN/creatinine ratio.





Patient is being admitted with altered mental status with possible dehydration.





Past History


Past Medical History: hypertension, seizures, stroke


Past Surgical History: No surgical history


Social history: no significant social history


Family history: no significant family history





Medications and Allergies


                                    Allergies











Allergy/AdvReac Type Severity Reaction Status Date / Time


 


No Known Allergies Allergy   Unverified 03/24/21 00:16











                                Home Medications











 Medication  Instructions  Recorded  Confirmed  Last Taken  Type


 


Aspirin [Adult Aspirin] 81 mg PO QDAY 03/24/21 03/24/21 Unknown History


 


Atorvastatin Calcium [Lipitor] 80 mg PO QDAY 03/24/21 03/24/21 Unknown History


 


Escitalopram [Lexapro] 10 mg PO DAILY 03/24/21 03/24/21 Unknown History


 


Lisinopril/Hydrochlorothiazide 1 each PO QDAY 03/24/21 03/24/21 Unknown History





[Zestoretic 10-12.5 mg Tablet]     


 


levETIRAcetam [Keppra TAB] 750 mg PO BID 03/24/21 03/24/21 Unknown History


 


Citalopram [celeXA] 10 mg PO DAILY 03/26/21 03/26/21 Unknown History


 


Aspirin 325 mg PO QDAY #30 tablet 03/31/21  Unknown Rx


 


AtorvaSTATin [Lipitor] 40 mg PO QHS #30 tablet 03/31/21  Unknown Rx


 


Citalopram [Celexa] 20 mg PO QDAY #30 tablet 03/31/21  Unknown Rx


 


Folic Acid [Folvite] 1 mg PO QDAY #30 tablet 03/31/21  Unknown Rx


 


Mirtazapine [Remeron 15mg TAB] 7.5 mg PO QHS #30 tablet 03/31/21  Unknown Rx


 


Multivitamin Tab [Multiple Vitamin 1 each PO QDAY #30 tablet 03/31/21  Unknown 

Rx





TAB (Theragran)]     


 


Thiamine [Vitamin B-1] 100 mg PO QDAY #30 tablet 03/31/21  Unknown Rx


 


haloperidoL [Haldol] 1 mg PO BID #30 tablet 03/31/21  Unknown Rx


 


levETIRAcetam [Keppra TAB] 1,000 mg PO BID #60 tablet 03/31/21  Unknown Rx











Active Meds: 


Active Medications





Acetaminophen (Acetaminophen 325 Mg Tab)  650 mg PO Q4H PRN


   PRN Reason: Pain MILD(1-3)/Fever >100.5/HA


Aspirin (Aspirin 325 Mg Tab)  325 mg PO QDAY [facility]


Atorvastatin Calcium (Atorvastatin 40 Mg Tab)  40 mg PO QHS [facility]


Citalopram Hydrobromide (Citalopram 20 Mg Tab)  20 mg PO QDAY [facility]


Escitalopram Oxalate (Escitalopram 10 Mg Tab)  10 mg PO DAILY [facility]


Folic Acid (Folic Acid 1 Mg Tab)  1 mg PO QDAY [facility]


Sodium Chloride (Nacl 0.9% 1000 Ml)  1,000 mls @ 125 mls/hr IV AS DIRECT [facility]


Magnesium Hydroxide (Magnesium Hydroxide (Mom) Oral Liqd Udc)  30 ml PO Q4H PRN


   PRN Reason: Constipation


Mirtazapine (Mirtazapine 15 Mg Tab)  7.5 mg PO QHS [facility]


Morphine Sulfate (Morphine 2 Mg/1 Ml Inj)  2 mg IV Q4H PRN


   PRN Reason: Pain, Moderate (4-6)


Multivitamins (Multivitamins ,Therapeutic Tab)  1 each PO QDAY [facility]


Ondansetron HCl (Ondansetron 4 Mg/2 Ml Inj)  4 mg IV Q8H PRN


   PRN Reason: Nausea And Vomiting


Sodium Chloride (Sodium Chloride 0.9% 10 Ml Flush Syringe)  10 ml IV BID FAIZA


Sodium Chloride (Sodium Chloride 0.9% 10 Ml Flush Syringe)  10 ml IV PRN PRN


   PRN Reason: LINE FLUSH


Thiamine HCl (Thiamine 100 Mg Tab)  100 mg PO QDAY [facility]











Review of Systems


ROS unobtainable: due to mental status





Exam





- Constitutional


Vitals: 


                                        











Temp Pulse Resp BP Pulse Ox


 


 97.8 F   84   16   106/75   98 


 


 04/15/21 18:25  04/15/21 19:00  04/15/21 19:00  04/15/21 19:00  04/15/21 19:00











General appearance: Present: no acute distress, well-nourished





- EENT


Eyes: Present: PERRL, EOM intact.  Absent: scleral icterus


ENT: hearing intact, clear oral mucosa, dentition normal





- Neck


Neck: Present: supple, normal ROM





- Respiratory


Respiratory effort: normal


Respiratory: bilateral: CTA





- Cardiovascular


Rhythm: regular


Heart Sounds: Present: S1 & S2.  Absent: gallop, systolic murmur, diastolic 

murmur, rub, click





- Extremities


Extremities: no ischemia, pulses intact, pulses symmetrical, No edema, normal 

temperature, normal color, Full ROM


Peripheral Pulses: within normal limits





- Abdominal


General gastrointestinal: Present: soft, non-tender, non-distended, normal bowel

sounds.  Absent: mass





- Integumentary


Integumentary: Present: clear, warm, dry.  Absent: rash





- Musculoskeletal


Musculoskeletal: strength equal bilaterally





- Psychiatric


Psychiatric: cooperative





- Neurologic


Neurologic: CNII-XII intact, no focal deficits, moves all extremities, other 

(Confused)





Results





- Labs


CBC & Chem 7: 


                                 04/16/21 04:50





                                 04/16/21 04:50


Labs: 


                              Abnormal lab results











  04/15/21 04/15/21 Range/Units





  18:13 18:13 


 


MCV  97 H   (84-94)  fl


 


MCH  33 H   (28-32)  pg


 


RDW  12.9 L   (13.2-15.2)  %


 


Mono % (Auto)  9.1 H   (0.0-7.3)  %


 


Mono # (Auto)  0.9 H   (0.0-0.8)  K/mm3


 


Potassium   3.4 L  (3.6-5.0)  mmol/L


 


BUN   34 H  (9-20)  mg/dL


 


Glucose   107 H  ()  mg/dL














Assessment and Plan





- Patient Problems


(1) Acute delirium


Current Visit: Yes   Status: Acute   


Plan to address problem: 


Etiology unclear.  However it may be multifactorial including dehydration and 

polypharmacy.


Will monitor mental status.








(2) Dehydration


Current Visit: Yes   Status: Acute   


Plan to address problem: 


Patient placed on IV fluid.


We will monitor chemistry.








(3) History of seizure


Current Visit: No   Status: Acute   


Plan to address problem: 


Patient will be placed on seizure precautions.


We will resume routine home medications.


We will request neurology evaluation.








(4) DVT prophylaxis


Current Visit: Yes   Status: Acute   


Plan to address problem: 


Patient placed on subcutaneous heparin.








(5) Full code status


Current Visit: Yes   Status: Acute   


Plan to address problem: 


Patient is full code.

## 2021-04-15 NOTE — EMERGENCY DEPARTMENT REPORT
ED Altered Mental Status HPI





- General


Chief Complaint: Altered Mental Status


Stated Complaint: SEIZURE/FALL


Time Seen by Provider: 04/15/21 18:02


Source: EMS


Mode of arrival: Stretcher


Limitations: Altered Mental Status





- History of Present Illness


Initial Comments: 





Chief complaint: Altered mental status possible seizure





HPI: This is a 51-year-old male with history of acute on chronic metabolic 

encephalopathy, depression, seizure, speech impairment, CVA, psychosis, PTSD who

was found altered on the ground.  Patient is a resident of skilled nursing 

facility [facility].  Patient was found unresponsive.  Nursing staff 

concerning patient had a seizure.  He received 2 mg IM of Ativan prior to 

arrival.  Patient is currently unresponsive.  He is lethargic.  He appears 

sedated.





Patient was admitted last month.  He was diagnosed with acute on chronic 

metabolic encephalopathy possible PTSD.





Wife who is POA gave further history.  She came to the emergency department to 

check on her .  Since he was discharged from the hospital 2 weeks ago to 

rehab debilitation program at [facility], patient has had decline.  

She suspects haloperidol has caused him to be sedated and altered.





Patient is able to ambulate with assistance.  He does have expressive aphasia.  

However he does recognize her.  After 1 week at the skilled nursing facility, 

patient was confused and combative.  He had disorganized speech.  He became more

more lethargic.  Yesterday she had a skilled nursing facility to stop Haldol.








MD Complaint: altered mental status, decreased responsiveness


-: This afternoon


Severity: severe


Consistency of Symptoms: constant


Context: seizure disorder





- Related Data


                                Home Medications











 Medication  Instructions  Recorded  Confirmed  Last Taken


 


Aspirin [Adult Aspirin] 81 mg PO QDAY 03/24/21 03/24/21 Unknown


 


Atorvastatin Calcium [Lipitor] 80 mg PO QDAY 03/24/21 03/24/21 Unknown


 


Escitalopram [Lexapro] 10 mg PO DAILY 03/24/21 03/24/21 Unknown


 


Lisinopril/Hydrochlorothiazide 1 each PO QDAY 03/24/21 03/24/21 Unknown





[Zestoretic 10-12.5 mg Tablet]    


 


levETIRAcetam [Keppra TAB] 750 mg PO BID 03/24/21 03/24/21 Unknown


 


Citalopram [celeXA] 10 mg PO DAILY 03/26/21 03/26/21 Unknown








                                  Previous Rx's











 Medication  Instructions  Recorded  Last Taken  Type


 


Aspirin 325 mg PO QDAY #30 tablet 03/31/21 Unknown Rx


 


AtorvaSTATin [Lipitor] 40 mg PO QHS #30 tablet 03/31/21 Unknown Rx


 


Citalopram [Celexa] 20 mg PO QDAY #30 tablet 03/31/21 Unknown Rx


 


Folic Acid [Folvite] 1 mg PO QDAY #30 tablet 03/31/21 Unknown Rx


 


Mirtazapine [Remeron 15mg TAB] 7.5 mg PO QHS #30 tablet 03/31/21 Unknown Rx


 


Multivitamin Tab [Multiple Vitamin 1 each PO QDAY #30 tablet 03/31/21 Unknown Rx





TAB (Theragran)]    


 


Thiamine [Vitamin B-1] 100 mg PO QDAY #30 tablet 03/31/21 Unknown Rx


 


haloperidoL [Haldol] 1 mg PO BID #30 tablet 03/31/21 Unknown Rx


 


levETIRAcetam [Keppra TAB] 1,000 mg PO BID #60 tablet 03/31/21 Unknown Rx











                                    Allergies











Allergy/AdvReac Type Severity Reaction Status Date / Time


 


No Known Allergies Allergy   Unverified 03/24/21 00:16














ED Review of Systems


ROS: 


Stated complaint: SEIZURE/FALL


Other details as noted in HPI





Comment: Unobtainable due to pts medical conditions (Altered mental status, 

history of encephalopathy)





ED Past Medical Hx





- Past Medical History


Previous Medical History?: Yes


Hx Hypertension: Yes


Hx CVA: Yes


Hx Seizures: Yes





- Social History


Smoking Status: Former Smoker





- Medications


Home Medications: 


                                Home Medications











 Medication  Instructions  Recorded  Confirmed  Last Taken  Type


 


Aspirin [Adult Aspirin] 81 mg PO QDAY 03/24/21 03/24/21 Unknown History


 


Atorvastatin Calcium [Lipitor] 80 mg PO QDAY 03/24/21 03/24/21 Unknown History


 


Escitalopram [Lexapro] 10 mg PO DAILY 03/24/21 03/24/21 Unknown History


 


Lisinopril/Hydrochlorothiazide 1 each PO QDAY 03/24/21 03/24/21 Unknown History





[Zestoretic 10-12.5 mg Tablet]     


 


levETIRAcetam [Keppra TAB] 750 mg PO BID 03/24/21 03/24/21 Unknown History


 


Citalopram [celeXA] 10 mg PO DAILY 03/26/21 03/26/21 Unknown History


 


Aspirin 325 mg PO QDAY #30 tablet 03/31/21  Unknown Rx


 


AtorvaSTATin [Lipitor] 40 mg PO QHS #30 tablet 03/31/21  Unknown Rx


 


Citalopram [Celexa] 20 mg PO QDAY #30 tablet 03/31/21  Unknown Rx


 


Folic Acid [Folvite] 1 mg PO QDAY #30 tablet 03/31/21  Unknown Rx


 


Mirtazapine [Remeron 15mg TAB] 7.5 mg PO QHS #30 tablet 03/31/21  Unknown Rx


 


Multivitamin Tab [Multiple Vitamin 1 each PO QDAY #30 tablet 03/31/21  Unknown 

Rx





TAB (Theragran)]     


 


Thiamine [Vitamin B-1] 100 mg PO QDAY #30 tablet 03/31/21  Unknown Rx


 


haloperidoL [Haldol] 1 mg PO BID #30 tablet 03/31/21  Unknown Rx


 


levETIRAcetam [Keppra TAB] 1,000 mg PO BID #60 tablet 03/31/21  Unknown Rx














ED Physical Exam





- General


Limitations: Altered Mental Status


General appearance: in no apparent distress, lethargic, other (Mouth open, p

rotecting airway,)





- Head


Head exam: Present: atraumatic, normocephalic





- Eye


Eye exam: Absent: scleral icterus, conjunctival injection





- ENT


ENT exam: Present: mucous membranes dry





- Neck


Neck exam: Present: normal inspection, full ROM





- Respiratory


Respiratory exam: Present: normal lung sounds bilaterally.  Absent: respiratory 

distress, wheezes, rales, rhonchi





- Cardiovascular


Cardiovascular Exam: Present: regular rate, normal rhythm, normal heart sounds. 

Absent: systolic murmur, diastolic murmur, rubs, gallop





- GI/Abdominal


GI/Abdominal exam: Present: soft, normal bowel sounds.  Absent: distended, 

tenderness, guarding, rebound





- Rectal


Rectal exam: Present: deferred





- Extremities Exam


Extremities exam: Present: normal inspection





- Neurological Exam


Neurological exam: Present: altered





- Psychiatric


Psychiatric exam: Present: flat affect





- Skin


Skin exam: Present: warm, dry, intact, normal color.  Absent: rash





ED Course


                                   Vital Signs











  04/15/21 04/15/21 04/15/21





  18:15 18:25 19:00


 


Temperature  97.8 F 


 


Pulse Rate 87 82 84


 


Respiratory 18 18 16





Rate   


 


Blood Pressure 113/69 117/79 106/75





[Left]   


 


O2 Sat by Pulse 99 100 98





Oximetry   














- Lab Data


Result diagrams: 


                                 04/15/21 18:13





                                 04/15/21 18:13


                                   Lab Results











  04/15/21 04/15/21 04/15/21 Range/Units





  18:13 18:13 18:13 


 


WBC  10.2    (4.5-11.0)  K/mm3


 


RBC  4.12    (3.65-5.03)  M/mm3


 


Hgb  13.6    (11.8-15.2)  gm/dl


 


Hct  40.1    (35.5-45.6)  %


 


MCV  97 H    (84-94)  fl


 


MCH  33 H    (28-32)  pg


 


MCHC  34    (32-34)  %


 


RDW  12.9 L    (13.2-15.2)  %


 


Plt Count  159    (140-440)  K/mm3


 


Lymph % (Auto)  19.8    (13.4-35.0)  %


 


Mono % (Auto)  9.1 H    (0.0-7.3)  %


 


Eos % (Auto)  0.6    (0.0-4.3)  %


 


Baso % (Auto)  0.5    (0.0-1.8)  %


 


Lymph # (Auto)  2.0    (1.2-5.4)  K/mm3


 


Mono # (Auto)  0.9 H    (0.0-0.8)  K/mm3


 


Eos # (Auto)  0.1    (0.0-0.4)  K/mm3


 


Baso # (Auto)  0.1    (0.0-0.1)  K/mm3


 


Seg Neutrophils %  70.0    (40.0-70.0)  %


 


Seg Neutrophils #  7.1    (1.8-7.7)  K/mm3


 


Sodium   144   (137-145)  mmol/L


 


Potassium   3.4 L   (3.6-5.0)  mmol/L


 


Chloride   104.1   ()  mmol/L


 


Carbon Dioxide   29   (22-30)  mmol/L


 


Anion Gap   14   mmol/L


 


BUN   34 H   (9-20)  mg/dL


 


Creatinine   1.0   (0.8-1.3)  mg/dL


 


Estimated GFR   > 60   ml/min


 


BUN/Creatinine Ratio   34   %


 


Glucose   107 H   ()  mg/dL


 


Calcium   9.7   (8.4-10.2)  mg/dL


 


Total Bilirubin   1.20   (0.1-1.2)  mg/dL


 


AST   40   (5-40)  units/L


 


ALT   20   (7-56)  units/L


 


Alkaline Phosphatase   77   ()  units/L


 


Total Protein   7.5   (6.3-8.2)  g/dL


 


Albumin   4.4   (3.9-5)  g/dL


 


Albumin/Globulin Ratio   1.4   %


 


TSH    0.709  (0.270-4.200)  mlU/mL


 


Urine Color     (Yellow)  


 


Urine Turbidity     (Clear)  


 


Urine pH     (5.0-7.0)  


 


Ur Specific Gravity     (1.003-1.030)  


 


Urine Protein     (Negative)  mg/dL


 


Urine Glucose (UA)     (Negative)  mg/dL


 


Urine Ketones     (Negative)  mg/dL


 


Urine Blood     (Negative)  


 


Urine Nitrite     (Negative)  


 


Urine Bilirubin     (Negative)  


 


Urine Urobilinogen     (<2.0)  mg/dL


 


Ur Leukocyte Esterase     (Negative)  


 


Urine WBC (Auto)     (0.0-6.0)  /HPF


 


Urine RBC (Auto)     (0.0-6.0)  /HPF


 


U Epithel Cells (Auto)     (0-13.0)  /HPF


 


Urine Mucus     /HPF














  04/15/21 Range/Units





  21:28 


 


WBC   (4.5-11.0)  K/mm3


 


RBC   (3.65-5.03)  M/mm3


 


Hgb   (11.8-15.2)  gm/dl


 


Hct   (35.5-45.6)  %


 


MCV   (84-94)  fl


 


MCH   (28-32)  pg


 


MCHC   (32-34)  %


 


RDW   (13.2-15.2)  %


 


Plt Count   (140-440)  K/mm3


 


Lymph % (Auto)   (13.4-35.0)  %


 


Mono % (Auto)   (0.0-7.3)  %


 


Eos % (Auto)   (0.0-4.3)  %


 


Baso % (Auto)   (0.0-1.8)  %


 


Lymph # (Auto)   (1.2-5.4)  K/mm3


 


Mono # (Auto)   (0.0-0.8)  K/mm3


 


Eos # (Auto)   (0.0-0.4)  K/mm3


 


Baso # (Auto)   (0.0-0.1)  K/mm3


 


Seg Neutrophils %   (40.0-70.0)  %


 


Seg Neutrophils #   (1.8-7.7)  K/mm3


 


Sodium   (137-145)  mmol/L


 


Potassium   (3.6-5.0)  mmol/L


 


Chloride   ()  mmol/L


 


Carbon Dioxide   (22-30)  mmol/L


 


Anion Gap   mmol/L


 


BUN   (9-20)  mg/dL


 


Creatinine   (0.8-1.3)  mg/dL


 


Estimated GFR   ml/min


 


BUN/Creatinine Ratio   %


 


Glucose   ()  mg/dL


 


Calcium   (8.4-10.2)  mg/dL


 


Total Bilirubin   (0.1-1.2)  mg/dL


 


AST   (5-40)  units/L


 


ALT   (7-56)  units/L


 


Alkaline Phosphatase   ()  units/L


 


Total Protein   (6.3-8.2)  g/dL


 


Albumin   (3.9-5)  g/dL


 


Albumin/Globulin Ratio   %


 


TSH   (0.270-4.200)  mlU/mL


 


Urine Color  Yellow  (Yellow)  


 


Urine Turbidity  Clear  (Clear)  


 


Urine pH  6.0  (5.0-7.0)  


 


Ur Specific Gravity  1.026  (1.003-1.030)  


 


Urine Protein  30 mg/dl  (Negative)  mg/dL


 


Urine Glucose (UA)  Neg  (Negative)  mg/dL


 


Urine Ketones  80  (Negative)  mg/dL


 


Urine Blood  Neg  (Negative)  


 


Urine Nitrite  Neg  (Negative)  


 


Urine Bilirubin  Neg  (Negative)  


 


Urine Urobilinogen  4.0  (<2.0)  mg/dL


 


Ur Leukocyte Esterase  Neg  (Negative)  


 


Urine WBC (Auto)  1.0  (0.0-6.0)  /HPF


 


Urine RBC (Auto)  < 1.0  (0.0-6.0)  /HPF


 


U Epithel Cells (Auto)  < 1.0  (0-13.0)  /HPF


 


Urine Mucus  2+  /HPF














- Radiology Data


Radiology results: report reviewed, image reviewed


Findings


Reporting MD: Phill Olvera


Dictation Time: April 15, 2021 18:36


: Not available


Transcription Date:


CT BRAIN: 4/15/2021 


INDICATION / CLINICAL INFORMATION: 


fall altered mental status. 


COMPARISON: 


MRI brain 3/26/2021. CT brain 3/23/2021. CT angiogram brain 3/23/2021 


FINDINGS: 


BRAIN/INTRACRANIAL STRUCTURES: Unenhanced CT images of the brain demonstrate no 

evidence of acute intracranial


abnormality. 


Ventricles and sulci are within normal limits of size and shape for a patient of

 this age. 


There is no evidence of hemorrhage or mass. There are no abnormal extra-axial 

fluid collections. 


EXTRACRANIAL STRUCTURES: Unremarkable. 


IMPRESSION: 


No acute abnormality. No change when compared to the recent prior exams.





- Medical Decision Making





1.  Altered Mental Status due to medication effect.  No evidence of metabolic 

derangement or infection.  Admitted for further treatment and evaluation.





After 4 hours observation emergency department, patient is awake but dazed.  He 

will make eye contact.  Additional consideration dystonia or tardive dyskinesia.

  I did order 2 mg of Cogentin.  





2.  Dehydration evidenced with tacky dry mucous membranes, confirmed by elevated

 BUN ketonuria.  Patient received IV fluid therapy.  According to MAR patient is

 on hydrochlorothiazide diuretic.  Daughter informed me that patient has placed 

on pured diet due to recent physical decline.


Critical care attestation.: 


If time is entered above; I have spent that time in minutes in the direct care 

of this critically ill patient, excluding procedure time.








ED Disposition


Clinical Impression: 


 Acute delirium, Polypharmacy, Dehydration





Disposition: DC-09 OP ADMIT IP TO THIS HOSP


Is pt being admited?: Yes


Does the pt Need Aspirin: No


Condition: Stable


Referrals: 


SAUL RABAGO MD [Primary Care Provider] - 3-5 Days

## 2021-04-15 NOTE — CAT SCAN REPORT
CT BRAIN: 4/15/2021



INDICATION / CLINICAL INFORMATION:

fall altered mental status.



COMPARISON: 

MRI brain 3/26/2021. CT brain 3/23/2021. CT angiogram brain 3/23/2021



FINDINGS:



BRAIN/INTRACRANIAL STRUCTURES: Unenhanced CT images of the brain demonstrate no evidence of acute int
racranial abnormality.



Ventricles and sulci are within normal limits of size and shape for a patient of this age.



There is no evidence of hemorrhage or mass. There are no abnormal extra-axial fluid collections.









EXTRACRANIAL STRUCTURES: Unremarkable.







IMPRESSION:

 No acute abnormality. No change when compared to the recent prior exams.









All CT scans at this location are performed using dose reduction to ALARA by means of automated expos
ure control.



Signer Name: Phill Olvera MD 

Signed: 4/15/2021 7:36 PM

Workstation Name: [id number]

## 2021-04-16 LAB
BASOPHILS # (AUTO): 0.1 K/MM3 (ref 0–0.1)
BASOPHILS NFR BLD AUTO: 0.6 % (ref 0–1.8)
BUN SERPL-MCNC: 32 MG/DL (ref 9–20)
BUN/CREAT SERPL: 36 %
CALCIUM SERPL-MCNC: 8.8 MG/DL (ref 8.4–10.2)
EOSINOPHIL # BLD AUTO: 0.1 K/MM3 (ref 0–0.4)
EOSINOPHIL NFR BLD AUTO: 0.6 % (ref 0–4.3)
HCT VFR BLD CALC: 35.8 % (ref 35.5–45.6)
HEMOLYSIS INDEX: 3
HGB BLD-MCNC: 12 GM/DL (ref 11.8–15.2)
INR PPP: 1.16 (ref 0.87–1.13)
LYMPHOCYTES # BLD AUTO: 2.4 K/MM3 (ref 1.2–5.4)
LYMPHOCYTES NFR BLD AUTO: 23.5 % (ref 13.4–35)
MCHC RBC AUTO-ENTMCNC: 34 % (ref 32–34)
MCV RBC AUTO: 97 FL (ref 84–94)
MONOCYTES # (AUTO): 0.9 K/MM3 (ref 0–0.8)
MONOCYTES % (AUTO): 9.2 % (ref 0–7.3)
PLATELET # BLD: 142 K/MM3 (ref 140–440)
RBC # BLD AUTO: 3.68 M/MM3 (ref 3.65–5.03)

## 2021-04-16 RX ADMIN — SODIUM CHLORIDE SCH MLS/HR: 0.9 INJECTION, SOLUTION INTRAVENOUS at 18:41

## 2021-04-16 RX ADMIN — MULTIVITAMIN TABLET SCH EACH: TABLET at 10:28

## 2021-04-16 RX ADMIN — MORPHINE SULFATE PRN MG: 2 INJECTION, SOLUTION INTRAMUSCULAR; INTRAVENOUS at 12:15

## 2021-04-16 RX ADMIN — ESCITALOPRAM OXALATE SCH MG: 10 TABLET ORAL at 10:28

## 2021-04-16 RX ADMIN — Medication SCH MG: at 10:28

## 2021-04-16 RX ADMIN — MIRTAZAPINE SCH MG: 15 TABLET, FILM COATED ORAL at 21:59

## 2021-04-16 RX ADMIN — ASPIRIN SCH MG: 325 TABLET ORAL at 10:28

## 2021-04-16 RX ADMIN — LEVETIRACETAM SCH MLS/HR: 100 INJECTION, SOLUTION INTRAVENOUS at 22:07

## 2021-04-16 RX ADMIN — Medication SCH ML: at 22:00

## 2021-04-16 RX ADMIN — FOLIC ACID SCH MG: 1 TABLET ORAL at 10:28

## 2021-04-16 RX ADMIN — LEVETIRACETAM SCH MLS/HR: 100 INJECTION, SOLUTION INTRAVENOUS at 12:22

## 2021-04-16 RX ADMIN — Medication SCH ML: at 10:29

## 2021-04-16 RX ADMIN — SODIUM CHLORIDE SCH MLS/HR: 0.9 INJECTION, SOLUTION INTRAVENOUS at 02:55

## 2021-04-16 NOTE — PROGRESS NOTE
Assessment and Plan





Assessment and Plan





- Patient Problems


(1) Acute delirium


Current Visit: Yes   Status: Acute   


Plan to address problem: 


Patient alert but stating in to space


Stiff


Appears catatonic








(2) Dehydration


Current Visit: Yes   Status: Acute   


Plan to address problem: 


Patient placed on IV fluid.


We will monitor chemistry.








(3) History of seizure


Current Visit: No   Status: Acute   


Plan to address problem: 


Keppra resumed





(4) Hypokalemia


Supplemented





(5) Full code status


Current Visit: Yes   Status: Acute   


Plan to address problem: 


Patient is full code.





DVT prophylaxis on heparin and GI prophylaxis


Patient in restraints











Subjective


Date of service: 04/16/21


Principal diagnosis: Acute encephalopathy


Interval history: 








51-year-old male with known history of acute on chronic metabolic 

encephalopathy, depression, speech impairment, seizure disorder, PTSD was 

brought to the emergency room today with altered mental status.  Patient is a 

resident of [facility] nursing [facility] of healthcare.  Nursing staff at 

the facility were concerned that patient may have had a seizure.  He was given 

IM Ativan prior to arrival in the emergency room.





Records indicates the patient was admitted last month and was diagnosed with 

acute on chronic metabolic encephalopathy and also PTSD.


Patient is a poor historian, most of the history was gotten from the ER 

physician.


Patient is said to be on multiple medications at the skilled facility.





Work-up in the emergency room today reveals elevated BUN/creatinine ratio.





Patient is being admitted with altered mental status with possible dehydration.





4/16/21


Patient is alert but not oriented


Just staring at me


In restraints


Staring into space








Objective





- Constitutional


Vitals: 


                               Vital Signs - 12hr











  04/16/21 04/16/21 04/16/21





  06:17 10:29 10:42


 


Temperature   97.8 F


 


Pulse Rate 70  


 


Respiratory   21





Rate   


 


Blood Pressure  157/87 


 


O2 Sat by Pulse   96





Oximetry   














  04/16/21





  11:00


 


Temperature 


 


Pulse Rate 62


 


Respiratory 





Rate 


 


Blood Pressure 


 


O2 Sat by Pulse 





Oximetry 











General appearance: Present: no acute distress, well-nourished





- EENT


Eyes: PERRL, EOM intact


ENT: hearing intact, clear oral mucosa


Ears: bilateral: normal





- Neck


Neck: supple, normal ROM





- Respiratory


Respiratory effort: normal


Respiratory: bilateral: CTA





- Breasts


Breasts: normal





- Cardiovascular


Heart rate: 78


Rhythm: regular


Heart Sounds: Present: S1 & S2.  Absent: gallop, rub


Extremities: pulses intact, No edema, normal color, Full ROM





- Gastrointestinal


General gastrointestinal: Present: soft, non-tender, non-distended, normal bowel

sounds





- Genitourinary


Male genitourinary: normal





- Integumentary


Integumentary: clear, warm, dry





- Musculoskeletal


Musculoskeletal: 1, strength equal bilaterally





- Neurologic


Neurologic: moves all extremities





- Psychiatric


Psychiatric: other (Flat affect)





- Allied health notes


Allied health notes reviewed: nursing, case management





- Labs


CBC & Chem 7: 


                                 04/16/21 04:50





                                 04/16/21 04:50


Labs: 


                              Abnormal lab results











  04/15/21 04/15/21 04/16/21 Range/Units





  18:13 18:13 04:50 


 


MCV  97 H   97 H  (84-94)  fl


 


MCH  33 H   33 H  (28-32)  pg


 


RDW  12.9 L   13.0 L  (13.2-15.2)  %


 


Mono % (Auto)  9.1 H   9.2 H  (0.0-7.3)  %


 


Mono # (Auto)  0.9 H   0.9 H  (0.0-0.8)  K/mm3


 


INR     (0.87-1.13)  


 


Sodium     (137-145)  mmol/L


 


Potassium   3.4 L   (3.6-5.0)  mmol/L


 


Chloride     ()  mmol/L


 


BUN   34 H   (9-20)  mg/dL


 


Glucose   107 H   ()  mg/dL














  04/16/21 04/16/21 Range/Units





  04:50 04:50 


 


MCV    (84-94)  fl


 


MCH    (28-32)  pg


 


RDW    (13.2-15.2)  %


 


Mono % (Auto)    (0.0-7.3)  %


 


Mono # (Auto)    (0.0-0.8)  K/mm3


 


INR  1.16 H   (0.87-1.13)  


 


Sodium   146 H  (137-145)  mmol/L


 


Potassium   3.0 L  (3.6-5.0)  mmol/L


 


Chloride   110.1 H  ()  mmol/L


 


BUN   32 H  (9-20)  mg/dL


 


Glucose    ()  mg/dL

## 2021-04-17 RX ADMIN — ASPIRIN SCH MG: 325 TABLET ORAL at 09:44

## 2021-04-17 RX ADMIN — Medication PRN ML: at 15:56

## 2021-04-17 RX ADMIN — POTASSIUM CHLORIDE SCH MLS/HR: 10 INJECTION, SOLUTION INTRAVENOUS at 17:47

## 2021-04-17 RX ADMIN — MULTIVITAMIN TABLET SCH EACH: TABLET at 09:43

## 2021-04-17 RX ADMIN — HALOPERIDOL SCH MG: 1 TABLET ORAL at 21:30

## 2021-04-17 RX ADMIN — POTASSIUM CHLORIDE SCH MLS/HR: 10 INJECTION, SOLUTION INTRAVENOUS at 15:43

## 2021-04-17 RX ADMIN — MORPHINE SULFATE PRN MG: 2 INJECTION, SOLUTION INTRAMUSCULAR; INTRAVENOUS at 15:55

## 2021-04-17 RX ADMIN — SODIUM CHLORIDE SCH MLS/HR: 0.9 INJECTION, SOLUTION INTRAVENOUS at 21:58

## 2021-04-17 RX ADMIN — LEVETIRACETAM SCH MLS/HR: 100 INJECTION, SOLUTION INTRAVENOUS at 21:30

## 2021-04-17 RX ADMIN — FOLIC ACID SCH MG: 1 TABLET ORAL at 09:43

## 2021-04-17 RX ADMIN — MIRTAZAPINE SCH MG: 15 TABLET, FILM COATED ORAL at 21:30

## 2021-04-17 RX ADMIN — Medication SCH MG: at 09:44

## 2021-04-17 RX ADMIN — Medication SCH ML: at 21:31

## 2021-04-17 RX ADMIN — LEVETIRACETAM SCH MLS/HR: 100 INJECTION, SOLUTION INTRAVENOUS at 09:48

## 2021-04-17 RX ADMIN — ESCITALOPRAM OXALATE SCH MG: 10 TABLET ORAL at 09:43

## 2021-04-17 RX ADMIN — SODIUM CHLORIDE SCH MLS/HR: 0.9 INJECTION, SOLUTION INTRAVENOUS at 06:00

## 2021-04-17 RX ADMIN — HALOPERIDOL SCH: 1 TABLET ORAL at 22:40

## 2021-04-17 RX ADMIN — Medication PRN ML: at 11:01

## 2021-04-17 RX ADMIN — HALOPERIDOL SCH MG: 1 TABLET ORAL at 14:30

## 2021-04-17 RX ADMIN — LISINOPRIL SCH MG: 10 TABLET ORAL at 14:06

## 2021-04-17 RX ADMIN — POTASSIUM CHLORIDE SCH MLS/HR: 10 INJECTION, SOLUTION INTRAVENOUS at 16:38

## 2021-04-17 RX ADMIN — POTASSIUM CHLORIDE SCH MLS/HR: 10 INJECTION, SOLUTION INTRAVENOUS at 14:18

## 2021-04-17 RX ADMIN — SODIUM CHLORIDE SCH MLS/HR: 0.9 INJECTION, SOLUTION INTRAVENOUS at 14:08

## 2021-04-17 RX ADMIN — MORPHINE SULFATE PRN MG: 2 INJECTION, SOLUTION INTRAMUSCULAR; INTRAVENOUS at 10:59

## 2021-04-17 RX ADMIN — Medication SCH ML: at 09:45

## 2021-04-17 RX ADMIN — MIRTAZAPINE SCH: 15 TABLET, FILM COATED ORAL at 22:40

## 2021-04-17 NOTE — PROGRESS NOTE
Assessment and Plan





Assessment and Plan





- Patient Problems


(1) Acute delirium


Current Visit: Yes   Status: Acute   


Plan to address problem: 


Patient alert but stating in to space


Stiff


Appears catatonic


Psychiatric and mental health consult requested








(2) Dehydration


Current Visit: Yes   Status: Acute   


Plan to address problem: 


Continue IV fluids





(3) History of seizure


Current Visit: No   Status: Acute   


Plan to address problem: 


Keppra resumed





(4) Hypokalemia


Supplemented





(5) Full code status


Current Visit: Yes   Status: Acute   


Plan to address problem: 


Patient is full code.





 6) catatonic state


We will defer to psychiatry if they agree with the catatonic state











DVT prophylaxis on heparin and GI prophylaxis


Patient in restraints











Subjective


Date of service: 04/17/21


Principal diagnosis: Acute encephalopathy


Interval history: 








51-year-old male with known history of acute on chronic metabolic 

encephalopathy, depression, speech impairment, seizure disorder, PTSD was 

brought to the emergency room today with altered mental status.  Patient is a 

resident of [facility] nursing [facility] of healthcare.  Nursing staff at 

the facility were concerned that patient may have had a seizure.  He was given 

IM Ativan prior to arrival in the emergency room.





Records indicates the patient was admitted last month and was diagnosed with 

acute on chronic metabolic encephalopathy and also PTSD.


Patient is a poor historian, most of the history was gotten from the ER 

physician.


Patient is said to be on multiple medications at the skilled facility.





Work-up in the emergency room today reveals elevated BUN/creatinine ratio.





Patient is being admitted with altered mental status with possible dehydration.





4/16/21


Patient is alert but not oriented


Just staring at me


In restraints


Staring into space





4/17/2021


Patient is alert but not oriented


Just staring at me


In restraints


Staring into space











Objective





- Constitutional


Vitals: 


                               Vital Signs - 12hr











  04/17/21 04/17/21 04/17/21





  04:30 07:59 11:47


 


Temperature 98.8 F 98.4 F 98.9 F


 


Pulse Rate 82 66 


 


Respiratory 18 18 20





Rate   


 


Blood Pressure 152/89 129/80 140/86


 


O2 Sat by Pulse 100 97 





Oximetry   














  04/17/21





  11:49


 


Temperature 


 


Pulse Rate 72


 


Respiratory 





Rate 


 


Blood Pressure 


 


O2 Sat by Pulse 97





Oximetry 











General appearance: Present: no acute distress, well-nourished





- EENT


Eyes: PERRL, EOM intact


ENT: hearing intact, clear oral mucosa


Ears: bilateral: normal





- Neck


Neck: supple, normal ROM





- Respiratory


Respiratory effort: normal


Respiratory: bilateral: CTA





- Breasts


Breasts: normal





- Cardiovascular


Heart rate: 78


Rhythm: regular


Heart Sounds: Present: S1 & S2.  Absent: gallop, rub


Extremities: pulses intact, No edema, normal color, Full ROM





- Gastrointestinal


General gastrointestinal: Present: soft, non-tender, non-distended, normal bowel

sounds





- Genitourinary


Male genitourinary: normal





- Integumentary


Integumentary: clear, warm, dry





- Musculoskeletal


Musculoskeletal: 1, strength equal bilaterally





- Neurologic


Neurologic: moves all extremities





- Psychiatric


Psychiatric: memory intact, appropriate mood/affect, intact judgment & insight





- Labs


CBC & Chem 7: 


                                 04/16/21 04:50





                                 04/16/21 04:50

## 2021-04-18 LAB
ALBUMIN SERPL-MCNC: 3.3 G/DL (ref 3.9–5)
ALT SERPL-CCNC: 15 UNITS/L (ref 7–56)
BASOPHILS # (AUTO): 0.1 K/MM3 (ref 0–0.1)
BASOPHILS NFR BLD AUTO: 0.6 % (ref 0–1.8)
BUN SERPL-MCNC: 15 MG/DL (ref 9–20)
BUN/CREAT SERPL: 21 %
CALCIUM SERPL-MCNC: 8.6 MG/DL (ref 8.4–10.2)
EOSINOPHIL # BLD AUTO: 0.1 K/MM3 (ref 0–0.4)
EOSINOPHIL NFR BLD AUTO: 0.5 % (ref 0–4.3)
HCT VFR BLD CALC: 32.1 % (ref 35.5–45.6)
HEMOLYSIS INDEX: 4
HGB BLD-MCNC: 10.9 GM/DL (ref 11.8–15.2)
LYMPHOCYTES # BLD AUTO: 2.2 K/MM3 (ref 1.2–5.4)
LYMPHOCYTES NFR BLD AUTO: 19.4 % (ref 13.4–35)
MCHC RBC AUTO-ENTMCNC: 34 % (ref 32–34)
MCV RBC AUTO: 97 FL (ref 84–94)
MONOCYTES # (AUTO): 0.8 K/MM3 (ref 0–0.8)
MONOCYTES % (AUTO): 7.2 % (ref 0–7.3)
PLATELET # BLD: 131 K/MM3 (ref 140–440)
RBC # BLD AUTO: 3.31 M/MM3 (ref 3.65–5.03)

## 2021-04-18 RX ADMIN — HALOPERIDOL SCH MG: 1 TABLET ORAL at 10:41

## 2021-04-18 RX ADMIN — LEVETIRACETAM SCH MLS/HR: 100 INJECTION, SOLUTION INTRAVENOUS at 09:43

## 2021-04-18 RX ADMIN — ESCITALOPRAM OXALATE SCH MG: 10 TABLET ORAL at 10:41

## 2021-04-18 RX ADMIN — SODIUM CHLORIDE SCH MLS/HR: 0.9 INJECTION, SOLUTION INTRAVENOUS at 13:38

## 2021-04-18 RX ADMIN — Medication SCH MG: at 10:41

## 2021-04-18 RX ADMIN — FOLIC ACID SCH MG: 1 TABLET ORAL at 10:40

## 2021-04-18 RX ADMIN — LEVETIRACETAM SCH MLS/HR: 100 INJECTION, SOLUTION INTRAVENOUS at 21:51

## 2021-04-18 RX ADMIN — MULTIVITAMIN TABLET SCH EACH: TABLET at 10:41

## 2021-04-18 RX ADMIN — Medication SCH ML: at 21:52

## 2021-04-18 RX ADMIN — LISINOPRIL SCH MG: 10 TABLET ORAL at 10:41

## 2021-04-18 RX ADMIN — Medication SCH ML: at 09:44

## 2021-04-18 RX ADMIN — ASPIRIN SCH MG: 325 TABLET ORAL at 10:41

## 2021-04-18 RX ADMIN — HALOPERIDOL SCH MG: 1 TABLET ORAL at 21:51

## 2021-04-18 RX ADMIN — MIRTAZAPINE SCH MG: 15 TABLET, FILM COATED ORAL at 21:51

## 2021-04-18 RX ADMIN — SODIUM CHLORIDE SCH MLS/HR: 0.9 INJECTION, SOLUTION INTRAVENOUS at 21:48

## 2021-04-18 RX ADMIN — SODIUM CHLORIDE SCH MLS/HR: 0.9 INJECTION, SOLUTION INTRAVENOUS at 05:44

## 2021-04-18 NOTE — CONSULTATION
History of Present Illness





- Reason for Consult


Consult date: 04/18/21


Reason for consult: MHE


Requesting physician: OSIEL LOUIS





- Chief Complaint


Chief complaint: 





Altered Mental Status





- History of Present Psychiatric Illness


Per ED Provider: This is a 51-year-old male with history of acute on chronic 

metabolic encephalopathy, depression, seizure, speech impairment, CVA, ps

ychosis, PTSD who was found altered on the ground.  Patient is a resident of 

skilled nursing facility [facility].  Patient was found unresponsive.

 Nursing staff concerning patient had a seizure.  He received 2 mg IM of Ativan 

prior to arrival.  Patient is currently unresponsive.  He is lethargic.  He 

appears sedated.


Patient was admitted last month.  He was diagnosed with acute on chronic 

metabolic encephalopathy possible PTSD.


Wife who is POA gave further history.  She came to the emergency department to 

check on her .  Since he was discharged from the hospital 2 weeks ago to 

rehab debilitation program at [facility], patient has had decline.  

She suspects haloperidol has caused him to be sedated and altered.


Patient is able to ambulate with assistance.  He does have expressive aphasia.  

However he does recognize her.  After 1 week at the skilled nursing facility, 

patient was confused and combative.  He had disorganized speech.  He became more

more lethargic.  Yesterday she had a skilled nursing facility to stop Haldol.





PSYCH HPI


Patient is a 51 year old  male admitted to hospital for 

worsening health with subsequent psychiatric assessment placed to rule out 

catatonia. Patient in room, flat affect, awake but no response to verbal 

stimuli. When tapped, ptt responds, raises his head but looks other, even though

im talking, does nto appear to respond to verbal stimuli.


Patient would respond everytime i tap his foot. 





PAST PSYCHIATRIC HISTORY


Diagnoses:n/a


Suicide attempts or Self-harm behavior: n/a


Prior psychiatric hospitalizations: n/a


Substance Abuse history:n/a


Previous psychiatric medications tried: n/a


Outpatient treatment: n/a





PAST MEDICAL HISTORY: n/a





Family Psychiatric History: None reported or documented





SOCIAL HISTORY


Marital Status: n/a


Living Arrangements: n/a


Employment Status: n/a


Access to guns/weapons: n/a


Education: n/a


History of Abuse: n/a


Legal History: n/a





REVIEW OF SYSTEMS


ROS cannot be reliably obtained from the patient due to his confusion and 

somnolence


 


MENTAL STATUS EXAMINATION


General Appearance and Behavior: Age appropriate, good hygiene, wearing 

appropriate clothes, good eye contact, uncooperative  with questioning.


Cooperation:  Withdrawn


Psychomotor Behavior: unremarkable and within normal limits


Mood: Neutral


Affect and affective range: Flat


Thought Process:N/A


Thought Content: N/A


Speech: no speech production


Intellectual Functioning: N/A


Suicidal Ideation: N/A l


Homicidal Ideation: N/A


Impulse Control: Impaired


Insight and Judgment:  Impaired


Memory: N/A


Attention: Normal,


Orientation: Alert





Diagnoses:








Treatment Plan


 AT this time, pt is not catatonic, when tapped, patient raise legs and head, 

wakes up but unable to keep gaze on me. No acute psych benefit at this time, 

will defer to primary team and recommend neurology evaluation. 


MEDICATIONS: 


Risks, benefits and alternatives of medications discussed with the patient, 

questions answered and consent obtained from patient.


PSYCHOTHERAPY: Supportive psychotherapy provided


MEDICAL: Per primary team


DELIRIUM PRECAUTIONS: Please re-orient patient frequently, keep lights on during

the day, and minimize benzodiazepines and opiates as these medications could 

worsen patient's confusion.


SAFETY SITTER:


DISPOSITION:   Do Not Recommend acute inpatient psychiatric hospitalization at 

this time. Case discussed with Dr. Whitten who agrees with current disposition


LEGAL STATUS: 


FOLLOW-UP: Will sign off


Thank you for the consult.  Please contact with any questions and/or concerns.


   





Medications and Allergies


                                    Allergies











Allergy/AdvReac Type Severity Reaction Status Date / Time


 


No Known Allergies Allergy   Unverified 03/24/21 00:16











                                Home Medications











 Medication  Instructions  Recorded  Confirmed  Last Taken  Type


 


Aspirin [Adult Aspirin] 81 mg PO QDAY 03/24/21 03/24/21 Unknown History


 


Atorvastatin Calcium [Lipitor] 80 mg PO QDAY 03/24/21 03/24/21 Unknown History


 


Escitalopram [Lexapro] 10 mg PO DAILY 03/24/21 03/24/21 Unknown History


 


Lisinopril/Hydrochlorothiazide 1 each PO QDAY 03/24/21 03/24/21 Unknown History





[Zestoretic 10-12.5 mg Tablet]     


 


levETIRAcetam [Keppra TAB] 750 mg PO BID 03/24/21 03/24/21 Unknown History


 


Citalopram [celeXA] 10 mg PO DAILY 03/26/21 03/26/21 Unknown History


 


Aspirin 325 mg PO QDAY #30 tablet 03/31/21  Unknown Rx


 


AtorvaSTATin [Lipitor] 40 mg PO QHS #30 tablet 03/31/21  Unknown Rx


 


Citalopram [Celexa] 20 mg PO QDAY #30 tablet 03/31/21  Unknown Rx


 


Folic Acid [Folvite] 1 mg PO QDAY #30 tablet 03/31/21  Unknown Rx


 


Mirtazapine [Remeron 15mg TAB] 7.5 mg PO QHS #30 tablet 03/31/21  Unknown Rx


 


Multivitamin Tab [Multiple Vitamin 1 each PO QDAY #30 tablet 03/31/21  Unknown 

Rx





TAB (Theragran)]     


 


Thiamine [Vitamin B-1] 100 mg PO QDAY #30 tablet 03/31/21  Unknown Rx


 


haloperidoL [Haldol] 1 mg PO BID #30 tablet 03/31/21  Unknown Rx


 


levETIRAcetam [Keppra TAB] 1,000 mg PO BID #60 tablet 03/31/21  Unknown Rx











Active Meds: 


Active Medications





Acetaminophen (Acetaminophen 325 Mg Tab)  650 mg PO Q4H PRN


   PRN Reason: Pain MILD(1-3)/Fever >100.5/HA


   Last Admin: 04/17/21 09:44 Dose:  650 mg


   Documented by: 


Aspirin (Aspirin 325 Mg Tab)  325 mg PO QDAY Critical access hospital


   Last Admin: 04/17/21 09:44 Dose:  325 mg


   Documented by: 


Atorvastatin Calcium (Atorvastatin 40 Mg Tab)  40 mg PO QHS Critical [facility] hospital


   Last Admin: 04/17/21 22:40 Dose:  Not Given


   Documented by: 


Escitalopram Oxalate (Escitalopram 10 Mg Tab)  10 mg PO DAILY Critical [facility] hospital


   Last Admin: 04/17/21 09:43 Dose:  10 mg


   Documented by: 


Folic Acid (Folic Acid 1 Mg Tab)  1 mg PO QDAY Critical access hospital


   Last Admin: 04/17/21 09:43 Dose:  1 mg


   Documented by: 


Haloperidol (Haloperidol 1 Mg Tab)  1 mg PO BID Critical access hospital


   Last Admin: 04/17/21 22:40 Dose:  Not Given


   Documented by: 


Hydrochlorothiazide (Hydrochlorothiazide 12.5 Mg Cap)  12.5 mg PO QDAY Critical [facility] hospital


   Last Admin: 04/17/21 14:06 Dose:  12.5 mg


   Documented by: 


Sodium Chloride (Nacl 0.9% 1000 Ml)  1,000 mls @ 125 mls/hr IV AS DIRECT Critical access hospital


   Last Admin: 04/18/21 05:44 Dose:  125 mls/hr


   Documented by: 


Levetiracetam 500 mg/ Dextrose  105 mls @ 400 mls/hr IV Q12HR [facility] hospital


   Last Admin: 04/18/21 09:43 Dose:  400 mls/hr


   Documented by: 


Lisinopril (Lisinopril 10 Mg Tab)  10 mg PO QDAY [facility] hospital


   Last Admin: 04/17/21 14:06 Dose:  10 mg


   Documented by: 


Lorazepam (Lorazepam 2 Mg/Ml Vial)  1 mg IV Q4H PRN


   PRN Reason: Seizures


   Last Admin: 04/17/21 21:48 Dose:  1 mg


   Documented by: 


Lorazepam (Lorazepam 2 Mg/Ml Vial)  1 mg IV Q4H PRN


   PRN Reason: Agitation


Magnesium Hydroxide (Magnesium Hydroxide (Mom) Oral Liqd Udc)  30 ml PO Q4H PRN


   PRN Reason: Constipation


Mirtazapine (Mirtazapine 15 Mg Tab)  7.5 mg PO QHS Critical [facility] hospital


   Last Admin: 04/17/21 22:40 Dose:  Not Given


   Documented by: 


Morphine Sulfate (Morphine 2 Mg/1 Ml Inj)  2 mg IV Q4H PRN


   PRN Reason: Pain, Moderate (4-6)


   Last Admin: 04/17/21 15:55 Dose:  2 mg


   Documented by: 


Multivitamins (Multivitamins ,Therapeutic Tab)  1 each PO QDAY Critical access hospital


   Last Admin: 04/17/21 09:43 Dose:  1 each


   Documented by: 


Ondansetron HCl (Ondansetron 4 Mg/2 Ml Inj)  4 mg IV Q8H PRN


   PRN Reason: Nausea And Vomiting


Sodium Chloride (Sodium Chloride 0.9% 10 Ml Flush Syringe)  10 ml IV BID Critical [facility] hospital


   Last Admin: 04/18/21 09:44 Dose:  10 ml


   Documented by: 


Sodium Chloride (Sodium Chloride 0.9% 10 Ml Flush Syringe)  10 ml IV PRN PRN


   PRN Reason: LINE FLUSH


   Last Admin: 04/17/21 15:56 Dose:  10 ml


   Documented by: 


Thiamine HCl (Thiamine 100 Mg Tab)  100 mg PO QDAY Critical access hospital


   Last Admin: 04/17/21 09:44 Dose:  100 mg


   Documented by: 











Mental Status Exam





- Vital signs


                                Last Vital Signs











Temp  97.8 F   04/18/21 08:00


 


Pulse  82   04/18/21 08:00


 


Resp  20   04/18/21 08:00


 


BP  120/75   04/18/21 08:00


 


Pulse Ox  100   04/18/21 08:00














Results


Result Diagrams: 


                                 04/18/21 05:33





                                 04/18/21 05:33


                              Abnormal lab results











  04/18/21 04/18/21 Range/Units





  05:33 05:33 


 


WBC  11.4 H   (4.5-11.0)  K/mm3


 


RBC  3.31 L   (3.65-5.03)  M/mm3


 


Hgb  10.9 L   (11.8-15.2)  gm/dl


 


Hct  32.1 L   (35.5-45.6)  %


 


MCV  97 H   (84-94)  fl


 


MCH  33 H   (28-32)  pg


 


RDW  13.1 L   (13.2-15.2)  %


 


Plt Count  131 L   (140-440)  K/mm3


 


Seg Neutrophils %  72.3 H   (40.0-70.0)  %


 


Seg Neutrophils #  8.2 H   (1.8-7.7)  K/mm3


 


Potassium   3.5 L  (3.6-5.0)  mmol/L


 


Chloride   109.8 H  ()  mmol/L


 


Creatinine   0.7 L  (0.8-1.3)  mg/dL


 


Total Protein   5.5 L D  (6.3-8.2)  g/dL


 


Albumin   3.3 L  (3.9-5)  g/dL








All other labs normal.

## 2021-04-18 NOTE — PROGRESS NOTE
Assessment and Plan





Assessment and Plan





- Patient Problems


(1) Acute delirium


Current Visit: Yes   Status: Acute   


Plan to address problem: 


Patient alert but stating in to space


Stiff


Appears catatonic


Psychiatric and mental health consult requested








(2) Acute metabolic encephalopathy


Current Visit: Yes   Status: Acute   


Plan to address problem: 


Continue IV fluids


Hx of CVA at [facility] with rt sided weakness as per wife





(3) History of seizure


Current Visit: No   Status: Acute   


Plan to address problem: 


Keppra resumed





(4) Hypokalemia


Supplemented





(5) Full code status


Current Visit: Yes   Status: Acute   


Plan to address problem: 


Patient is full code.





 6) catatonic state


We will defer to psychiatry if they agree with the catatonic state











DVT prophylaxis on heparin and GI prophylaxis


Patient in restraints











Subjective


Date of service: 04/18/21


Principal diagnosis: Acute encephalopathy


Interval history: 








51-year-old male with known history of acute on chronic metabolic 

encephalopathy, depression, speech impairment, seizure disorder, PTSD was 

brought to the emergency room today with altered mental status.  Patient is a 

resident of [facility] nursing [facility] of healthcare.  Nursing staff at 

the facility were concerned that patient may have had a seizure.  He was given 

IM Ativan prior to arrival in the emergency room.





Records indicates the patient was admitted last month and was diagnosed with 

acute on chronic metabolic encephalopathy and also PTSD.


Patient is a poor historian, most of the history was gotten from the ER 

physician.


Patient is said to be on multiple medications at the skilled facility.





Work-up in the emergency room today reveals elevated BUN/creatinine ratio.





Patient is being admitted with altered mental status with possible dehydration.





4/16/21


Patient is alert but not oriented


Just staring at me


In restraints


Staring into space





4/17/2021


Patient is alert but not oriented


Just staring at me


In restraints


Staring into space





4/18


Same condition


ALert but not oriented


Staring into space














Objective





- Constitutional


Vitals: 


                               Vital Signs - 12hr











  04/18/21 04/18/21 04/18/21





  04:05 08:00 10:41


 


Temperature 97.8 F 97.8 F 


 


Pulse Rate 84 82 


 


Respiratory 20 20 





Rate   


 


Blood Pressure 128/71 120/75 120/70


 


O2 Sat by Pulse 99 100 





Oximetry   














  04/18/21 04/18/21 04/18/21





  11:37 12:19 13:00


 


Temperature  97.5 F L 


 


Pulse Rate  95 H 83


 


Respiratory 18 18 





Rate   


 


Blood Pressure  141/85 


 


O2 Sat by Pulse 96 100 





Oximetry   











General appearance: Present: no acute distress, well-nourished





- EENT


Eyes: PERRL, EOM intact


ENT: hearing intact, clear oral mucosa


Ears: bilateral: normal





- Neck


Neck: supple, normal ROM





- Respiratory


Respiratory effort: normal


Respiratory: bilateral: CTA





- Breasts


Breasts: normal





- Cardiovascular


Heart rate: 78


Rhythm: regular


Heart Sounds: Present: S1 & S2.  Absent: gallop, rub


Extremities: pulses intact, No edema, normal color, Full ROM





- Gastrointestinal


General gastrointestinal: Present: soft, non-tender, non-distended, normal bowel

sounds





- Genitourinary


Male genitourinary: normal





- Integumentary


Integumentary: clear, warm, dry





- Musculoskeletal


Musculoskeletal: right sided weakness, generalized weakness





- Neurologic


Neurologic: moves all extremities





- Psychiatric


Psychiatric: other (Alert but aphasic)





- Labs


CBC & Chem 7: 


                                 04/18/21 05:33





                                 04/18/21 05:33


Labs: 


                              Abnormal lab results











  04/18/21 04/18/21 Range/Units





  05:33 05:33 


 


WBC  11.4 H   (4.5-11.0)  K/mm3


 


RBC  3.31 L   (3.65-5.03)  M/mm3


 


Hgb  10.9 L   (11.8-15.2)  gm/dl


 


Hct  32.1 L   (35.5-45.6)  %


 


MCV  97 H   (84-94)  fl


 


MCH  33 H   (28-32)  pg


 


RDW  13.1 L   (13.2-15.2)  %


 


Plt Count  131 L   (140-440)  K/mm3


 


Seg Neutrophils %  72.3 H   (40.0-70.0)  %


 


Seg Neutrophils #  8.2 H   (1.8-7.7)  K/mm3


 


Potassium   3.5 L  (3.6-5.0)  mmol/L


 


Chloride   109.8 H  ()  mmol/L


 


Creatinine   0.7 L  (0.8-1.3)  mg/dL


 


Total Protein   5.5 L D  (6.3-8.2)  g/dL


 


Albumin   3.3 L  (3.9-5)  g/dL

## 2021-04-19 RX ADMIN — LEVETIRACETAM SCH MLS/HR: 100 INJECTION, SOLUTION INTRAVENOUS at 21:31

## 2021-04-19 RX ADMIN — HALOPERIDOL SCH: 1 TABLET ORAL at 22:36

## 2021-04-19 RX ADMIN — FOLIC ACID SCH MG: 1 TABLET ORAL at 09:35

## 2021-04-19 RX ADMIN — ASPIRIN SCH MG: 325 TABLET ORAL at 09:35

## 2021-04-19 RX ADMIN — LEVETIRACETAM SCH MLS/HR: 100 INJECTION, SOLUTION INTRAVENOUS at 09:33

## 2021-04-19 RX ADMIN — ESCITALOPRAM OXALATE SCH MG: 10 TABLET ORAL at 09:35

## 2021-04-19 RX ADMIN — HALOPERIDOL SCH MG: 1 TABLET ORAL at 09:35

## 2021-04-19 RX ADMIN — Medication PRN ML: at 21:31

## 2021-04-19 RX ADMIN — Medication SCH MG: at 09:35

## 2021-04-19 RX ADMIN — Medication SCH ML: at 10:28

## 2021-04-19 RX ADMIN — MULTIVITAMIN TABLET SCH EACH: TABLET at 09:35

## 2021-04-19 RX ADMIN — SODIUM CHLORIDE SCH MLS/HR: 0.9 INJECTION, SOLUTION INTRAVENOUS at 06:43

## 2021-04-19 RX ADMIN — MIRTAZAPINE SCH: 15 TABLET, FILM COATED ORAL at 22:37

## 2021-04-19 RX ADMIN — LISINOPRIL SCH MG: 10 TABLET ORAL at 09:35

## 2021-04-19 RX ADMIN — Medication SCH ML: at 21:31

## 2021-04-19 NOTE — CONSULTATION
History of Present Illness





- Reason for Consult


Consult date: 04/19/21


Evaluate for Acute IRU


Requesting physician: OSIEL LOUIS





- History of Present Illness


We were asked to consult on this 51-year-old gentleman who presented to the ER 

with altered mental status.  Patient was previously here in late March and those

records were also reviewed.  At that time a CVA work-up was completed and MRI of

the brain did not show any signs of a CVA and only showed minimal white matter 

disease.  Patient does have a seizure disorder and is being treated with Keppra.

 Apparently after his last encounter at our hospital he was discharged to a 

skilled nursing facility and per reports from the patient's wife it appears he 

was being given Haldol at the outside facility.  Prior to his readmission to us 

it appears that he was found unresponsive.  Currently the patient is still in 

restraints and is noncommunicative and he does not follow any commands during 

examination.  Nursing states that patient has been refusing to eat even when 

they attempt to feed him he closes his lips.  He has been noncommunicative with 

them.  Does not show any signs of pain.  When they attempted to bathe him he 

pulls away and then starts laughing uncontrollably.  Is having a hard time 

taking medications even when they crush them.  Patient remains in restraints for

his own safety.  Has urine output via condom cath.  No recent bowel movements, 

nausea or vomiting.





We were asked to evaluate the patient for possible admission to acute inpatient 

rehabilitation and based on his current condition he would not be an appropriate

candidate and may be more appropriate to return to skilled nursing or other type

of facility.  If his condition improves to where he is out of restraints and 

following commands we could reevaluate the patient further for participation in 

an acute inpatient rehabilitation program.  We would need to have him evaluated 

by therapy which has already been ordered to see if he is able to participate at

the level that is required.  Information gathered from examination of patient, 

speaking with the nurse and physician caring for the patient and the medical 

record as the patient is not communicative.








Past History


Past Medical History: hypertension, seizures


Past Surgical History: No surgical history


Social history: lives with family (Most recently in skilled nursing facility), 

smoking


Family history: other (Unobtainable due to patient condition)





Medications and Allergies


                                    Allergies











Allergy/AdvReac Type Severity Reaction Status Date / Time


 


No Known Allergies Allergy   Unverified 03/24/21 00:16











                                Home Medications











 Medication  Instructions  Recorded  Confirmed  Last Taken  Type


 


Aspirin [Adult Aspirin] 81 mg PO QDAY 03/24/21 03/24/21 Unknown History


 


Atorvastatin Calcium [Lipitor] 80 mg PO QDAY 03/24/21 03/24/21 Unknown History


 


Escitalopram [Lexapro] 10 mg PO DAILY 03/24/21 03/24/21 Unknown History


 


Lisinopril/Hydrochlorothiazide 1 each PO QDAY 03/24/21 03/24/21 Unknown History





[Zestoretic 10-12.5 mg Tablet]     


 


levETIRAcetam [Keppra TAB] 750 mg PO BID 03/24/21 03/24/21 Unknown History


 


Citalopram [celeXA] 10 mg PO DAILY 03/26/21 03/26/21 Unknown History


 


Aspirin 325 mg PO QDAY #30 tablet 03/31/21  Unknown Rx


 


AtorvaSTATin [Lipitor] 40 mg PO QHS #30 tablet 03/31/21  Unknown Rx


 


Citalopram [Celexa] 20 mg PO QDAY #30 tablet 03/31/21  Unknown Rx


 


Folic Acid [Folvite] 1 mg PO QDAY #30 tablet 03/31/21  Unknown Rx


 


Mirtazapine [Remeron 15mg TAB] 7.5 mg PO QHS #30 tablet 03/31/21  Unknown Rx


 


Multivitamin Tab [Multiple Vitamin 1 each PO QDAY #30 tablet 03/31/21  Unknown 

Rx





TAB (Theragran)]     


 


Thiamine [Vitamin B-1] 100 mg PO QDAY #30 tablet 03/31/21  Unknown Rx


 


haloperidoL [Haldol] 1 mg PO BID #30 tablet 03/31/21  Unknown Rx


 


levETIRAcetam [Keppra TAB] 1,000 mg PO BID #60 tablet 03/31/21  Unknown Rx











Active Meds: 


Active Medications





Acetaminophen (Acetaminophen 325 Mg Tab)  650 mg PO Q4H PRN


   PRN Reason: Pain MILD(1-3)/Fever >100.5/HA


   Last Admin: 04/17/21 09:44 Dose:  650 mg


   Documented by: 


Aspirin (Aspirin 325 Mg Tab)  325 mg PO QDAY [facility]


   Last Admin: 04/19/21 09:35 Dose:  325 mg


   Documented by: 


Atorvastatin Calcium (Atorvastatin 40 Mg Tab)  40 mg PO QHS [facility]


   Last Admin: 04/18/21 21:51 Dose:  40 mg


   Documented by: 


Escitalopram Oxalate (Escitalopram 10 Mg Tab)  10 mg PO DAILY [facility]


   Last Admin: 04/19/21 09:35 Dose:  10 mg


   Documented by: 


Folic Acid (Folic Acid 1 Mg Tab)  1 mg PO QDAY [facility]


   Last Admin: 04/19/21 09:35 Dose:  1 mg


   Documented by: 


Haloperidol (Haloperidol 1 Mg Tab)  1 mg PO BID [facility]


   Last Admin: 04/19/21 09:35 Dose:  1 mg


   Documented by: 


Hydrochlorothiazide (Hydrochlorothiazide 12.5 Mg Cap)  12.5 mg PO QDAY [facility]


   Last Admin: 04/19/21 09:34 Dose:  12.5 mg


   Documented by: 


Sodium Chloride (Nacl 0.9% 1000 Ml)  1,000 mls @ 125 mls/hr IV AS DIRECT [facility]


   Last Admin: 04/19/21 06:43 Dose:  125 mls/hr


   Documented by: 


Levetiracetam 500 mg/ Dextrose  105 mls @ 400 mls/hr IV Q12HR [facility]


   Last Admin: 04/19/21 09:33 Dose:  400 mls/hr


   Documented by: 


Lisinopril (Lisinopril 10 Mg Tab)  10 mg PO QDAY [facility]


   Last Admin: 04/19/21 09:35 Dose:  10 mg


   Documented by: 


Lorazepam (Lorazepam 2 Mg/Ml Vial)  1 mg IV Q4H PRN


   PRN Reason: Seizures


   Last Admin: 04/18/21 22:04 Dose:  1 mg


   Documented by: 


Lorazepam (Lorazepam 2 Mg/Ml Vial)  1 mg IV Q4H PRN


   PRN Reason: Agitation


Magnesium Hydroxide (Magnesium Hydroxide (Mom) Oral Liqd Udc)  30 ml PO Q4H PRN


   PRN Reason: Constipation


Mirtazapine (Mirtazapine 15 Mg Tab)  7.5 mg PO QHS [facility]


   Last Admin: 04/18/21 21:51 Dose:  7.5 mg


   Documented by: 


Morphine Sulfate (Morphine 2 Mg/1 Ml Inj)  2 mg IV Q4H PRN


   PRN Reason: Pain, Moderate (4-6)


   Last Admin: 04/17/21 15:55 Dose:  2 mg


   Documented by: 


Multivitamins (Multivitamins ,Therapeutic Tab)  1 each PO QDAY [facility]


   Last Admin: 04/19/21 09:35 Dose:  1 each


   Documented by: 


Ondansetron HCl (Ondansetron 4 Mg/2 Ml Inj)  4 mg IV Q8H PRN


   PRN Reason: Nausea And Vomiting


Sodium Chloride (Sodium Chloride 0.9% 10 Ml Flush Syringe)  10 ml IV BID [facility]


   Last Admin: 04/19/21 10:28 Dose:  10 ml


   Documented by: 


Sodium Chloride (Sodium Chloride 0.9% 10 Ml Flush Syringe)  10 ml IV PRN PRN


   PRN Reason: LINE FLUSH


   Last Admin: 04/17/21 15:56 Dose:  10 ml


   Documented by: 


Thiamine HCl (Thiamine 100 Mg Tab)  100 mg PO QDAY [facility]


   Last Admin: 04/19/21 09:35 Dose:  100 mg


   Documented by: 











Review of Systems


ROS unobtainable: due to mental status (10 systems reviewed with medical record,

examination and nursing as per HPI and here)


Constitutional: poor appetite


Ears, nose, mouth and throat: no epistaxis, no neck lump


Cardiovascular: no rapid/irregular heart beat, no edema


Respiratory: no cough, no shortness of breath


Gastrointestinal: no nausea, no vomiting


Genitourinary Male: no hematuria


Musculoskeletal: no redness of joints


Integumentary: no rash, no pruritis


Neurological: seizures, no tremors


Psychiatric: confusion, other (Inappropriate laughing)





Exam





- Exam


Narrative exam: 


MUSCULOSKELETAL SPECIALTY EXAM





CONSTITUTIONAL:


Well developed, well nourished, appropriately groomed, and restrain





LYMPHATIC: 


No appreciable abnormalities palpable in neck





RESPIRATORY:


Clear to auscultation bilaterally, no increased work of breathing 





CARDIOVASCULAR:  


Regular Rate/ Rhythm, no swelling, edema or tenderness in BUE or BLE. All 

extremities warm.  





GI: 


+ bowel sounds, soft, NTTP, nondistended.





INTEGUMENTARY:


Normal, no lesion, rash, masses or bruising noted in extremities. 





MUSCULOSKELETAL:


BUE and BLE normal without defect, crepitus, subluxation, effusion, arthritic 

changes or TTP. 


Patient is able to move all extremities at least 3/5, but I was unable to test 

range of motion and tone due to decreased command following





NEURO:


Patient does respond to his name but will not look to find the source of the 

voice.  Even when standing directly in his field-of-view, he everts his eyes and

looks elsewhere.  Sensation unable to fully be tested due to decreased 

interaction.  Reflexes unable to be tested because the patient would contract 

muscles when attempting to test, decreased command following. Coordination 

unable to be tested due to decreased command following. No tremor noted in 4 

extremities.   





POSTURE and GAIT:


Balance and gait deferred until seen with therapy.





PSYCH:


Alert, confused, affect appears blunted.  Insight appears impaired.





- Constitutional


Vitals: 


                               Vital Signs - 12hr











  04/19/21 04/19/21 04/19/21





  03:24 05:00 08:12


 


Temperature 98.5 F  98.7 F


 


Pulse Rate 89 89 79


 


Respiratory 17  18





Rate   


 


Blood Pressure 165/105  135/91


 


O2 Sat by Pulse 99  100





Oximetry   














  04/19/21





  11:00


 


Temperature 


 


Pulse Rate 


 


Respiratory 





Rate 


 


Blood Pressure 


 


O2 Sat by Pulse 98





Oximetry 














- Labs


CBC & Chem 7: 


                                 04/18/21 05:33





                                 04/18/21 05:33


Labs: 


                         Laboratory Results - last 72 hr











  04/18/21 04/18/21 04/18/21





  05:33 05:33 17:51


 


WBC  11.4 H  


 


RBC  3.31 L  


 


Hgb  10.9 L  


 


Hct  32.1 L  


 


MCV  97 H  


 


MCH  33 H  


 


MCHC  34  


 


RDW  13.1 L  


 


Plt Count  131 L  


 


Lymph % (Auto)  19.4  


 


Mono % (Auto)  7.2  


 


Eos % (Auto)  0.5  


 


Baso % (Auto)  0.6  


 


Lymph # (Auto)  2.2  


 


Mono # (Auto)  0.8  


 


Eos # (Auto)  0.1  


 


Baso # (Auto)  0.1  


 


Seg Neutrophils %  72.3 H  


 


Seg Neutrophils #  8.2 H  


 


Sodium   143 


 


Potassium   3.5 L 


 


Chloride   109.8 H 


 


Carbon Dioxide   25 


 


Anion Gap   12 


 


BUN   15 


 


Creatinine   0.7 L 


 


Estimated GFR   > 60 


 


BUN/Creatinine Ratio   21 


 


Glucose   91 


 


POC Glucose    84


 


Calcium   8.6 


 


Total Bilirubin   0.80 


 


AST   30 


 


ALT   15 


 


Alkaline Phosphatase   64 


 


Total Protein   5.5 L D 


 


Albumin   3.3 L 


 


Albumin/Globulin Ratio   1.5 














Assessment and Plan


Patient was assessed and evaluated for Acute Inpatient Rehab Unit.





Metabolic encephalopathy versus seizure?:  Uncertain if his current condition is

a continuation of his prior episodes on his previous admission or if there is an

additive insult.  Could be related to medication, seizure disorder, or new CVA. 

Apparently the patient was on Haldol at the outside facility and per available 

notes the wife was requesting that he be withdrawn from these.  Patient remains 

in restraints and is not responsive to voice.  He does pull away when attempting

to examine.  Prior MRI brain did not show CVA that has been mentioned.  Current 

CT head also shows no changes when compared to previous imaging.





ADL dysfunction: OT will work on improving ability to perform ADLs (including 

assistive devices) to increase independence and decrease caregiver burden and 

improve functional transfers and mobility training.





Difficulty walking: PT will work on gait training and proper use of assistive 

devices and advance as appropriate to use of stairs and outside ambulation on 

uneven surfaces.





Unsteadiness on feet: PT will work on improving static and dynamic sitting and 

standing balance as well as proper use of assistive devices to decrease risk of 

falls.





Muscle weakness: PT & OT will work on strengthening exercises to improve 

functional strength including mixture of closed and open kinetic chain 

exercises.





Debility: PT & OT will work on improving overall functional status to improve 

participation with ADLs, mobility and social involvement.





Fatigue: PT & OT will work on improving endurance through aerobic exercises and 

therapeutic activity while monitoring patients tolerance for activity and vital

signs as needed.


 


Pain: Continue physical modalities in therapy and pain medications as needed to 

achieve functional pain control.  


Sleep: Monitor and address as needed. 


Bowel: Monitor and address as needed.  


Appetite: Monitor and address as needed.





Restrictions/ Precautions:


Falls, seizure





WB status: FWB





Thank you very much for the opportunity to consult on this patient.  At this 

time, the patient does not meet requirements to be included in an acute 

rehabilitation program as he is still not following commands and remains in 

restraints.  If his medical condition changes and he is able to safely remain 

out of restraints and starts following commands and still has deficits that 

require further rehabilitation, please reconsult and will be happy to assess him

at that time for participation in an acute rehab program.  Therapy services have

already been ordered for the patient by Dr. Louis and I will follow their 

progress with the patient.


Recommendations: may discharge to skilled nursing facility when medically stable

## 2021-04-19 NOTE — CONSULTATION
History of Present Illness


Consult date: 04/19/21


Reason for Consult: AMS


Chief complaint: 





AMS


History of present illness: 


52 yo male with recent stroke in 2/2021 with bilateral arm weakness with severe 

expressive aphasia, seizure d/o, acute on chronic metabolic encephalopathy, 

depression, PTSD, admitted to the hospitial for notable subacute encephalopathy 

which the wife (at bedside) initially attributed to the haldol that was being 

administered to him.  However, after being off Haldol for all these days, the 

patient continues to not return to his baseline.  Patient's baseline seizures 

tend to be subtle staring off episodes rather than the generalized seizures, 

according to his wife.  She notes that his aphasia much more severe than his 

normal baseline, especially with the new receptive element to his presentation. 

Currently awaiting EEG.





Past History


Past Medical History: hypertension, seizures, stroke, other (ptsd, depression;)


Past Surgical History: No surgical history


Social history: lives with family (Most recently in skilled nursing facility), 

smoking


Family history: other (Unobtainable due to patient condition)





Medications and Allergies


                                    Allergies











Allergy/AdvReac Type Severity Reaction Status Date / Time


 


No Known Allergies Allergy   Unverified 03/24/21 00:16











                                Home Medications











 Medication  Instructions  Recorded  Confirmed  Last Taken  Type


 


Aspirin [Adult Aspirin] 81 mg PO QDAY 03/24/21 03/24/21 Unknown History


 


Atorvastatin Calcium [Lipitor] 80 mg PO QDAY 03/24/21 03/24/21 Unknown History


 


Escitalopram [Lexapro] 10 mg PO DAILY 03/24/21 03/24/21 Unknown History


 


Lisinopril/Hydrochlorothiazide 1 each PO QDAY 03/24/21 03/24/21 Unknown History





[Zestoretic 10-12.5 mg Tablet]     


 


levETIRAcetam [Keppra TAB] 750 mg PO BID 03/24/21 03/24/21 Unknown History


 


Citalopram [celeXA] 10 mg PO DAILY 03/26/21 03/26/21 Unknown History


 


Aspirin 325 mg PO QDAY #30 tablet 03/31/21  Unknown Rx


 


AtorvaSTATin [Lipitor] 40 mg PO QHS #30 tablet 03/31/21  Unknown Rx


 


Citalopram [Celexa] 20 mg PO QDAY #30 tablet 03/31/21  Unknown Rx


 


Folic Acid [Folvite] 1 mg PO QDAY #30 tablet 03/31/21  Unknown Rx


 


Mirtazapine [Remeron 15mg TAB] 7.5 mg PO QHS #30 tablet 03/31/21  Unknown Rx


 


Multivitamin Tab [Multiple Vitamin 1 each PO QDAY #30 tablet 03/31/21  Unknown 

Rx





TAB (Theragran)]     


 


Thiamine [Vitamin B-1] 100 mg PO QDAY #30 tablet 03/31/21  Unknown Rx


 


haloperidoL [Haldol] 1 mg PO BID #30 tablet 03/31/21  Unknown Rx


 


levETIRAcetam [Keppra TAB] 1,000 mg PO BID #60 tablet 03/31/21  Unknown Rx











Active Meds: 


Active Medications





Acetaminophen (Acetaminophen 325 Mg Tab)  650 mg PO Q4H PRN


   PRN Reason: Pain MILD(1-3)/Fever >100.5/HA


   Last Admin: 04/17/21 09:44 Dose:  650 mg


   Documented by: 


Aspirin (Aspirin 325 Mg Tab)  325 mg PO QDAY [facility]


   Last Admin: 04/19/21 09:35 Dose:  325 mg


   Documented by: 


Atorvastatin Calcium (Atorvastatin 40 Mg Tab)  40 mg PO QHS [facility]


   Last Admin: 04/18/21 21:51 Dose:  40 mg


   Documented by: 


Escitalopram Oxalate (Escitalopram 10 Mg Tab)  10 mg PO DAILY [facility]


   Last Admin: 04/19/21 09:35 Dose:  10 mg


   Documented by: 


Folic Acid (Folic Acid 1 Mg Tab)  1 mg PO QDAY [facility]


   Last Admin: 04/19/21 09:35 Dose:  1 mg


   Documented by: 


Haloperidol (Haloperidol 1 Mg Tab)  1 mg PO BID [facility]


   Last Admin: 04/19/21 09:35 Dose:  1 mg


   Documented by: 


Hydrochlorothiazide (Hydrochlorothiazide 12.5 Mg Cap)  12.5 mg PO QDAY [facility]


   Last Admin: 04/19/21 09:34 Dose:  12.5 mg


   Documented by: 


Sodium Chloride (Nacl 0.9% 1000 Ml)  1,000 mls @ 125 mls/hr IV AS DIRECT [facility]


   Last Admin: 04/19/21 06:43 Dose:  125 mls/hr


   Documented by: 


Levetiracetam 500 mg/ Dextrose  105 mls @ 400 mls/hr IV Q12HR [facility]


   Last Admin: 04/19/21 09:33 Dose:  400 mls/hr


   Documented by: 


Lisinopril (Lisinopril 10 Mg Tab)  10 mg PO QDAY [facility]


   Last Admin: 04/19/21 09:35 Dose:  10 mg


   Documented by: 


Lorazepam (Lorazepam 2 Mg/Ml Vial)  1 mg IV Q4H PRN


   PRN Reason: Seizures


   Last Admin: 04/18/21 22:04 Dose:  1 mg


   Documented by: 


Lorazepam (Lorazepam 2 Mg/Ml Vial)  1 mg IV Q4H PRN


   PRN Reason: Agitation


Magnesium Hydroxide (Magnesium Hydroxide (Mom) Oral Liqd Udc)  30 ml PO Q4H PRN


   PRN Reason: Constipation


Mirtazapine (Mirtazapine 15 Mg Tab)  7.5 mg PO QHS [facility]


   Last Admin: 04/18/21 21:51 Dose:  7.5 mg


   Documented by: 


Morphine Sulfate (Morphine 2 Mg/1 Ml Inj)  2 mg IV Q4H PRN


   PRN Reason: Pain, Moderate (4-6)


   Last Admin: 04/17/21 15:55 Dose:  2 mg


   Documented by: 


Multivitamins (Multivitamins ,Therapeutic Tab)  1 each PO QDAY [facility]


   Last Admin: 04/19/21 09:35 Dose:  1 each


   Documented by: 


Ondansetron HCl (Ondansetron 4 Mg/2 Ml Inj)  4 mg IV Q8H PRN


   PRN Reason: Nausea And Vomiting


Sodium Chloride (Sodium Chloride 0.9% 10 Ml Flush Syringe)  10 ml IV BID [facility]


   Last Admin: 04/19/21 10:28 Dose:  10 ml


   Documented by: 


Sodium Chloride (Sodium Chloride 0.9% 10 Ml Flush Syringe)  10 ml IV PRN PRN


   PRN Reason: LINE FLUSH


   Last Admin: 04/17/21 15:56 Dose:  10 ml


   Documented by: 


Thiamine HCl (Thiamine 100 Mg Tab)  100 mg PO QDAY [facility]


   Last Admin: 04/19/21 09:35 Dose:  100 mg


   Documented by: 











Review of Systems


ROS unobtainable: due to mental status





Physical Examination





- Vital Signs


Vital Signs: 


                                   Vital Signs











Pulse Resp BP Pulse Ox


 


 87   18   113/69   99 


 


 04/15/21 18:15  04/15/21 18:15  04/15/21 18:15  04/15/21 18:15














- Physical Exam


Narrative exam: 





Gen: nad


Head: normocephalic;


Eyes: no gaze deviation; no ptosis appreciated;


ENT:  +ETT;


CVS:  warm and well-perfused;


Pulm: no respiratory distress;


GI: non-distended, non-protuberant;


Ext: no cyanosis at distal extremities;


Skin: no acute rash at distal extremities;


Heme: no bruising at distal extremities;


Neuro: alert, nonsensical vocalization, o/w severe mixed expressive/receptive 

aphasia; CN 2 - sluggish reactive pupils, pt cannot participate w/ visual field 

exam; CN 3, 4, 6 - no gaze deviation, CN 5/7 - open/close eyes spontaneously, CN

9/10 - no swallowing noted; no spontaneous coughs noted, CN 11/12 - pt cannot 

cooperate secondary to LOC; Motor/Sensory - at least 2/5 at all proximal exts 

with at least 1/5 at bilateral distral extremiteis; at least 2/5 at distal RLE 

and at least 3/5 a distal LLE and 2/5 at distal RLE to tactile stimuli; 

Cerebellar/Gait - pt cannot cooperate secondary to LOC; NIHSS>22;





Results





- Laboratory Findings


CBC and BMP: 


                                 04/18/21 05:33





                                 04/18/21 05:33


Abnormal Lab Findings: 


                                  Abnormal Labs











  04/15/21 04/15/21 04/16/21





  18:13 18:13 04:50


 


WBC   


 


RBC   


 


Hgb   


 


Hct   


 


MCV  97 H   97 H


 


MCH  33 H   33 H


 


RDW  12.9 L   13.0 L


 


Plt Count   


 


Mono % (Auto)  9.1 H   9.2 H


 


Mono # (Auto)  0.9 H   0.9 H


 


Seg Neutrophils %   


 


Seg Neutrophils #   


 


INR   


 


Sodium   


 


Potassium   3.4 L 


 


Chloride   


 


BUN   34 H 


 


Creatinine   


 


Glucose   107 H 


 


Total Protein   


 


Albumin   














  04/16/21 04/16/21 04/18/21





  04:50 04:50 05:33


 


WBC    11.4 H


 


RBC    3.31 L


 


Hgb    10.9 L


 


Hct    32.1 L


 


MCV    97 H


 


MCH    33 H


 


RDW    13.1 L


 


Plt Count    131 L


 


Mono % (Auto)   


 


Mono # (Auto)   


 


Seg Neutrophils %    72.3 H


 


Seg Neutrophils #    8.2 H


 


INR  1.16 H  


 


Sodium   146 H 


 


Potassium   3.0 L 


 


Chloride   110.1 H 


 


BUN   32 H 


 


Creatinine   


 


Glucose   


 


Total Protein   


 


Albumin   














  04/18/21





  05:33


 


WBC 


 


RBC 


 


Hgb 


 


Hct 


 


MCV 


 


MCH 


 


RDW 


 


Plt Count 


 


Mono % (Auto) 


 


Mono # (Auto) 


 


Seg Neutrophils % 


 


Seg Neutrophils # 


 


INR 


 


Sodium 


 


Potassium  3.5 L


 


Chloride  109.8 H


 


BUN 


 


Creatinine  0.7 L


 


Glucose 


 


Total Protein  5.5 L D


 


Albumin  3.3 L














Assessment and Plan


52 yo male with recent stroke in 2/2021 with bilateral arm weakness with severe 

expressive aphasia, seizure d/o, acute on chronic metabolic encephalopathy, 

depression, PTSD, admitted to the hospitial for notable subacute encephalopathy 

which the wife (at bedside) initially attributed to the haldol that was being 

administered to him at the facility but now with continued encephalopathy.





1.  Metabolic Encephalopathy - workup per primary team.





2.  Subclinical Seizure - recommend transfer if patient will not be able to 

undergo an EEG within the next 24 hours.





3.  Acute Ischemic Stroke (vs. extension of previous stroke vs. recrudescence) -

ordered MR Brain w/ wo contrast; aspirin 81 mg po qday and statin therapy for a 

goal ldl of 70.





4. NMS - in the differential diagnosis; consider bromocriptine/dantrolene 

combination to note for any improvement.





5.  Aspiration / Seizure precautions.





Bruno Link MD


Neurology

## 2021-04-19 NOTE — PROGRESS NOTE
Assessment and Plan





Assessment and Plan





- Patient Problems


(1) Acute delirium


Current Visit: Yes   Status: Acute   


Plan to address problem: 


Patient alert but stating in to space


Per Psychiatry --No catatonic state





(2) Dehydration


Current Visit: Yes   Status: Acute   


Plan to address problem: 


Continue IV fluids





(3) History of seizure


Current Visit: No   Status: Acute   


Plan to address problem: 


Keppra resumed





(4) Hypokalemia


Supplemented





(5) Acute Encephalopathy


Etio unclear


EEG /MRI requested





Current Visit: Yes   Status: Acute   


Plan to address problem: 


Patient is full code.





 





DVT prophylaxis on heparin and GI prophylaxis


Patient in restraints











Subjective


Date of service: 04/19/21


Principal diagnosis: Acute encephalopathy


Interval history: 








51-year-old male with known history of acute on chronic metabolic 

encephalopathy, depression, speech impairment, seizure disorder, PTSD was 

brought to the emergency room today with altered mental status.  Patient is a 

resident of [facility] nursing [facility]-referred of healthcare.  Nursing staff at 

the facility were concerned that patient may have had a seizure.  He was given 

IM Ativan prior to arrival in the emergency room.





Records indicates the patient was admitted last month and was diagnosed with 

acute on chronic metabolic encephalopathy and also PTSD.


Patient is a poor historian, most of the history was gotten from the ER 

physician.


Patient is said to be on multiple medications at the skilled facility.





Work-up in the emergency room today reveals elevated BUN/creatinine ratio.





Patient is being admitted with altered mental status with possible dehydration.





4/16/21


Patient is alert but not oriented


Just staring at me


In restraints


Staring into space





4/17/2021


Patient is alert but not oriented


Just staring at me


In restraints


Staring into space





4/18


Same condition


ALert but not oriented


Staring into space





Addendum I took extensive history from the wife and reviewed the previous 

admission from March 24 to the discharge time which was approximately March 31, 2021.  Patient was apparently walking and dragging his right lower extremity.  

Not using walker.  But patient has been having persistent expressive aphasia.  

Able to do his ADLs ~admission March 23 on March 31 patient was transferred to 

[facility] for rehab.  MRI was negative for any acute subacute stroke.  

Patient was labeled as TIA when he was discharged from [facility] but as per wife 

patient was having right-sided deficits which are more consistent with cer

ebrovascular accident but the imaging studies are not showing any infarct.  

Patient has seizures off and on and is on antiepileptics.  Depakote was 

recommended but not carried through.  Will defer to neurology and psychiatry.  

If cleared by psychiatry and neurology will transfer back to [facility] 

with a regular follow-up with neurology and psychiatry as outpatient.  Patient 

is otherwise stable except for a low potassium which is being supplemented.








4/19


As per psych--no catatonia


Neuro consult appreciated


Ordered MRI agai


Recommended EEG

















Objective





- Constitutional


Vitals: 


                               Vital Signs - 12hr











  04/18/21 04/19/21 04/19/21





  23:30 03:24 05:00


 


Temperature 98.3 F 98.5 F 


 


Pulse Rate 88 89 89


 


Respiratory 14 17 





Rate   


 


Blood Pressure 150/97 165/105 


 


O2 Sat by Pulse 99 99 





Oximetry   














  04/19/21





  08:12


 


Temperature 98.7 F


 


Pulse Rate 79


 


Respiratory 18





Rate 


 


Blood Pressure 135/91


 


O2 Sat by Pulse 100





Oximetry 











General appearance: Present: no acute distress, well-nourished





- EENT


Eyes: PERRL, EOM intact


ENT: hearing intact, clear oral mucosa


Ears: bilateral: normal





- Neck


Neck: supple, normal ROM





- Respiratory


Respiratory effort: normal


Respiratory: bilateral: CTA





- Breasts


Breasts: normal





- Cardiovascular


Heart rate: 78


Rhythm: regular


Heart Sounds: Present: S1 & S2.  Absent: gallop, rub


Extremities: pulses intact, No edema, normal color, Full ROM





- Gastrointestinal


General gastrointestinal: Present: soft, non-tender, non-distended, normal bowel

sounds





- Genitourinary


Male genitourinary: normal





- Integumentary


Integumentary: clear, warm, dry





- Musculoskeletal


Musculoskeletal: 1, strength equal bilaterally





- Neurologic


Neurologic: focal deficits (Could not be tested Expressive aphasia)





- Psychiatric


Psychiatric: other (Aphasic)





- Allied health notes


Allied health notes reviewed: nursing, case management





- Labs


CBC & Chem 7: 


                                 04/18/21 05:33





                                 04/18/21 05:33

## 2021-04-20 RX ADMIN — ASPIRIN SCH MG: 325 TABLET ORAL at 09:40

## 2021-04-20 RX ADMIN — Medication SCH MG: at 09:40

## 2021-04-20 RX ADMIN — SODIUM CHLORIDE SCH MLS/HR: 0.9 INJECTION, SOLUTION INTRAVENOUS at 15:30

## 2021-04-20 RX ADMIN — MIRTAZAPINE SCH MG: 15 TABLET, FILM COATED ORAL at 22:42

## 2021-04-20 RX ADMIN — SODIUM CHLORIDE SCH MLS/HR: 0.9 INJECTION, SOLUTION INTRAVENOUS at 04:29

## 2021-04-20 RX ADMIN — FOLIC ACID SCH MG: 1 TABLET ORAL at 09:42

## 2021-04-20 RX ADMIN — LEVETIRACETAM SCH MLS/HR: 100 INJECTION, SOLUTION INTRAVENOUS at 10:02

## 2021-04-20 RX ADMIN — Medication SCH ML: at 22:43

## 2021-04-20 RX ADMIN — LEVETIRACETAM SCH MLS/HR: 100 INJECTION, SOLUTION INTRAVENOUS at 22:41

## 2021-04-20 RX ADMIN — HALOPERIDOL SCH: 1 TABLET ORAL at 09:43

## 2021-04-20 RX ADMIN — Medication SCH ML: at 09:43

## 2021-04-20 RX ADMIN — MULTIVITAMIN TABLET SCH EACH: TABLET at 09:40

## 2021-04-20 RX ADMIN — LISINOPRIL SCH MG: 10 TABLET ORAL at 09:41

## 2021-04-20 RX ADMIN — HALOPERIDOL SCH MG: 1 TABLET ORAL at 22:42

## 2021-04-20 RX ADMIN — ESCITALOPRAM OXALATE SCH MG: 10 TABLET ORAL at 09:39

## 2021-04-20 NOTE — PROGRESS NOTE
Assessment and Plan


Assessment and plan: 





#Acute metabolic encephalopathy versus delirium


Patient is alert but nonverbal


Psychiatry evaluation appreciated


Need to rule out seizures-MRI brain with and without contrast and EEG ordered





#History of seizures


Keppra


Neurology evaluation appreciated


MRI brain with and without contrast ordered


EEG


Plan for possible transfer pending EEG results.





#Dehydration


Encourage p.o. intake





# Hypokalemia


Replete





DVT prophylaxis on heparin and GI prophylaxis


Patient in restraints











History


Interval history: 


51-year-old male with known history of acute on chronic metabolic 

encephalopathy, depression, speech impairment, seizure disorder, PTSD was 

brought to the emergency room today with altered mental status.  Patient is a 

resident of [facility] nursing [facility]-referred of healthcare.  Nursing staff at 

the facility were concerned that patient may have had a seizure.  He was given 

IM Ativan prior to arrival in the emergency room.





Records indicates the patient was admitted last month and was diagnosed with 

acute on chronic metabolic encephalopathy and also PTSD.


Patient is a poor historian, most of the history was gotten from the ER 

physician.


Patient is said to be on multiple medications at the skilled facility.





Work-up in the emergency room today reveals elevated BUN/creatinine ratio.





Patient is being admitted with altered mental status with possible dehydration.





4/16/21


Patient is alert but not oriented


Just staring at me


In restraints


Staring into space





4/17/2021


Patient is alert but not oriented


Just staring at me


In restraints


Staring into space





4/18


Same condition


ALert but not oriented


Staring into space





Addendum I took extensive history from the wife and reviewed the previous 

admission from March 24 to the discharge time which was approximately March 31, 2021.  Patient was apparently walking and dragging his right lower extremity.  

Not using walker.  But patient has been having persistent expressive aphasia.  

Able to do his ADLs ~admission March 23 on March 31 patient was transferred to 

[facility] for rehab.  MRI was negative for any acute subacute stroke.  

Patient was labeled as TIA when he was discharged from [facility] but as per wife 

patient was having right-sided deficits which are more consistent with 

cerebrovascular accident but the imaging studies are not showing any infarct.  

Patient has seizures off and on and is on antiepileptics.  Depakote was 

recommended but not carried through.  Will defer to neurology and psychiatry.  

If cleared by psychiatry and neurology will transfer back to [facility] 

with a regular follow-up with neurology and psychiatry as outpatient.  Patient 

is otherwise stable except for a low potassium which is being supplemented.





4/19


As per psych--no catatonia


Neuro consult appreciated


Ordered MRI agai


Recommended EEG





4/20.


Patient is nonverbal this AM.


He was staring at me.  He subsequently burst into laughter as I tried to listen 

to his chest.


Psychiatry has already evaluated patient


MRI brain with and without contrast and EEG pending


Neurology following








Hospitalist Physical





- Physical exam


Narrative exam: 





VITAL SIGNS:  Reviewed.    


GENERAL: Awake


HEAD:  No signs of head trauma.


EYES:  Pupils are equal.  Extraocular motions intact.  


MOUTH:  Oropharynx is normal. 


NECK:  No adenopathy, no JVD.  


CHEST:  Chest with diminished breath sounds bilaterally.  No wheezes, rales, or 

rhonchi.  


CARDIAC:  normal S1 and S2, without murmurs, gallops, or rubs.


ABDOMEN:  Soft, non tender and non distended.  No   rebound or guarding, and no 

masses palpated.   Bowel Sounds normal.


MUSCULOSKELETAL:  No edema


NEUROLOGIC EXAM: Alert but nonverbal.  No new focal neurologic deficits noted.


Psych exam: Nonverbal


SKIN: No obvious lesions








- Constitutional


Vitals: 


                                        











Temp Pulse Resp BP Pulse Ox


 


 98.5 F   85   18   146/81   100 


 


 04/20/21 03:24  04/20/21 09:41  04/20/21 07:54  04/20/21 09:41  04/20/21 03:24














Results





- Labs


CBC & Chem 7: 


                                 04/18/21 05:33





                                 04/18/21 05:33


Labs: 


                             Laboratory Last Values











WBC  11.4 K/mm3 (4.5-11.0)  H  04/18/21  05:33    


 


RBC  3.31 M/mm3 (3.65-5.03)  L  04/18/21  05:33    


 


Hgb  10.9 gm/dl (11.8-15.2)  L  04/18/21  05:33    


 


Hct  32.1 % (35.5-45.6)  L  04/18/21  05:33    


 


MCV  97 fl (84-94)  H  04/18/21  05:33    


 


MCH  33 pg (28-32)  H  04/18/21  05:33    


 


MCHC  34 % (32-34)   04/18/21  05:33    


 


RDW  13.1 % (13.2-15.2)  L  04/18/21  05:33    


 


Plt Count  131 K/mm3 (140-440)  L  04/18/21  05:33    


 


Lymph % (Auto)  19.4 % (13.4-35.0)   04/18/21  05:33    


 


Mono % (Auto)  7.2 % (0.0-7.3)   04/18/21  05:33    


 


Eos % (Auto)  0.5 % (0.0-4.3)   04/18/21  05:33    


 


Baso % (Auto)  0.6 % (0.0-1.8)   04/18/21  05:33    


 


Lymph # (Auto)  2.2 K/mm3 (1.2-5.4)   04/18/21  05:33    


 


Mono # (Auto)  0.8 K/mm3 (0.0-0.8)   04/18/21  05:33    


 


Eos # (Auto)  0.1 K/mm3 (0.0-0.4)   04/18/21  05:33    


 


Baso # (Auto)  0.1 K/mm3 (0.0-0.1)   04/18/21  05:33    


 


Seg Neutrophils %  72.3 % (40.0-70.0)  H  04/18/21  05:33    


 


Seg Neutrophils #  8.2 K/mm3 (1.8-7.7)  H  04/18/21  05:33    


 


PT  14.6 Sec. (12.2-14.9)   04/16/21  04:50    


 


INR  1.16  (0.87-1.13)  H  04/16/21  04:50    


 


Sodium  143 mmol/L (137-145)   04/18/21  05:33    


 


Potassium  3.5 mmol/L (3.6-5.0)  L  04/18/21  05:33    


 


Chloride  109.8 mmol/L ()  H  04/18/21  05:33    


 


Carbon Dioxide  25 mmol/L (22-30)   04/18/21  05:33    


 


Anion Gap  12 mmol/L  04/18/21  05:33    


 


BUN  15 mg/dL (9-20)   04/18/21  05:33    


 


Creatinine  0.7 mg/dL (0.8-1.3)  L  04/18/21  05:33    


 


Estimated GFR  > 60 ml/min  04/18/21  05:33    


 


BUN/Creatinine Ratio  21 %  04/18/21  05:33    


 


Glucose  91 mg/dL ()   04/18/21  05:33    


 


POC Glucose  84 mg/dL ()   04/18/21  17:51    


 


Calcium  8.6 mg/dL (8.4-10.2)   04/18/21  05:33    


 


Total Bilirubin  0.80 mg/dL (0.1-1.2)   04/18/21  05:33    


 


AST  30 units/L (5-40)   04/18/21  05:33    


 


ALT  15 units/L (7-56)   04/18/21  05:33    


 


Alkaline Phosphatase  64 units/L ()   04/18/21  05:33    


 


Total Protein  5.5 g/dL (6.3-8.2)  L D 04/18/21  05:33    


 


Albumin  3.3 g/dL (3.9-5)  L  04/18/21  05:33    


 


Albumin/Globulin Ratio  1.5 %  04/18/21  05:33    


 


TSH  0.709 mlU/mL (0.270-4.200)   04/15/21  18:13    


 


Urine Color  Yellow  (Yellow)   04/15/21  21:28    


 


Urine Turbidity  Clear  (Clear)   04/15/21  21:28    


 


Urine pH  6.0  (5.0-7.0)   04/15/21  21:28    


 


Ur Specific Gravity  1.026  (1.003-1.030)   04/15/21  21:28    


 


Urine Protein  30 mg/dl mg/dL (Negative)   04/15/21  21:28    


 


Urine Glucose (UA)  Neg mg/dL (Negative)   04/15/21  21:28    


 


Urine Ketones  80 mg/dL (Negative)   04/15/21  21:28    


 


Urine Blood  Neg  (Negative)   04/15/21  21:28    


 


Urine Nitrite  Neg  (Negative)   04/15/21  21:28    


 


Urine Bilirubin  Neg  (Negative)   04/15/21  21:28    


 


Urine Urobilinogen  4.0 mg/dL (<2.0)   04/15/21  21:28    


 


Ur Leukocyte Esterase  Neg  (Negative)   04/15/21  21:28    


 


Urine WBC (Auto)  1.0 /HPF (0.0-6.0)   04/15/21  21:28    


 


Urine RBC (Auto)  < 1.0 /HPF (0.0-6.0)   04/15/21  21:28    


 


U Epithel Cells (Auto)  < 1.0 /HPF (0-13.0)   04/15/21  21:28    


 


Urine Mucus  2+ /HPF  04/15/21  21:28    











Gamble/IV: 


                                        





Voiding Method                   Condom Catheter











Active Medications





- Current Medications


Current Medications: 














Generic Name Dose Route Start Last Admin





  Trade Name Freq  PRN Reason Stop Dose Admin


 


Acetaminophen  650 mg  04/15/21 22:12  04/17/21 09:44





  Acetaminophen 325 Mg Tab  PO   650 mg





  Q4H PRN   Administration





  Pain MILD(1-3)/Fever >100.5/HA  


 


Aspirin  325 mg  04/16/21 10:00  04/20/21 09:40





  Aspirin 325 Mg Tab  PO   325 mg





  QDAY FAIZA   Administration


 


Atorvastatin Calcium  40 mg  04/16/21 22:00  04/19/21 22:36





  Atorvastatin 40 Mg Tab  PO   Not Given





  QHS FAIZA  


 


Escitalopram Oxalate  10 mg  04/16/21 10:00  04/20/21 09:39





  Escitalopram 10 Mg Tab  PO   10 mg





  DAILY FAIZA   Administration


 


Folic Acid  1 mg  04/16/21 10:00  04/20/21 09:42





  Folic Acid 1 Mg Tab  PO   1 mg





  QDAY FAIZA   Administration


 


Haloperidol  1 mg  04/17/21 13:00  04/20/21 09:43





  Haloperidol 1 Mg Tab  PO   Not Given





  BID FAIZA  


 


Hydrochlorothiazide  12.5 mg  04/17/21 13:00  04/20/21 09:41





  Hydrochlorothiazide 12.5 Mg Cap  PO   12.5 mg





  QDAY FAIZA   Administration


 


Sodium Chloride  1,000 mls @ 125 mls/hr  04/15/21 23:15  04/20/21 04:29





  Nacl 0.9% 1000 Ml  IV   125 mls/hr





  AS DIRECT FAIZA   Administration


 


Levetiracetam 500 mg/ Dextrose  105 mls @ 400 mls/hr  04/16/21 10:00  04/20/21 

10:02





  IV   400 mls/hr





  Q12HR FAIZA   Administration


 


Lisinopril  10 mg  04/17/21 13:00  04/20/21 09:41





  Lisinopril 10 Mg Tab  PO   10 mg





  QDAY FAIZA   Administration


 


Lorazepam  1 mg  04/17/21 21:37  04/18/21 22:04





  Lorazepam 2 Mg/Ml Vial  IV   1 mg





  Q4H PRN   Administration





  Seizures  


 


Lorazepam  1 mg  04/17/21 21:41  04/19/21 21:32





  Lorazepam 2 Mg/Ml Vial  IV   1 mg





  Q4H PRN   Administration





  Agitation  


 


Magnesium Hydroxide  30 ml  04/15/21 23:03 





  Magnesium Hydroxide (Mom) Oral Liqd Udc  PO  





  Q4H PRN  





  Constipation  


 


Mirtazapine  7.5 mg  04/16/21 22:00  04/19/21 22:37





  Mirtazapine 15 Mg Tab  PO   Not Given





  QHS FAIZA  


 


Morphine Sulfate  2 mg  04/15/21 23:03  04/17/21 15:55





  Morphine 2 Mg/1 Ml Inj  IV   2 mg





  Q4H PRN   Administration





  Pain, Moderate (4-6)  


 


Multivitamins  1 each  04/16/21 10:00  04/20/21 09:40





  Multivitamins ,Therapeutic Tab  PO   1 each





  QDAY FAIZA   Administration


 


Ondansetron HCl  4 mg  04/15/21 22:12 





  Ondansetron 4 Mg/2 Ml Inj  IV  





  Q8H PRN  





  Nausea And Vomiting  


 


Sodium Chloride  10 ml  04/16/21 10:00  04/20/21 09:43





  Sodium Chloride 0.9% 10 Ml Flush Syringe  IV   10 ml





  BID FAIZA   Administration


 


Sodium Chloride  10 ml  04/15/21 22:12  04/19/21 21:31





  Sodium Chloride 0.9% 10 Ml Flush Syringe  IV   10 ml





  PRN PRN   Administration





  LINE FLUSH  


 


Thiamine HCl  100 mg  04/16/21 10:00  04/20/21 09:40





  Thiamine 100 Mg Tab  PO   100 mg





  QDAY FAIZA   Administration














Nutrition/Malnutrition Assess





- Dietary Evaluation


Nutrition/Malnutrition Findings: 


                                        





Nutrition Notes                                            Start:  04/19/21 

10:12


Freq:                                                      Status: Active       




Protocol:                                                                       




 Document     04/19/21 10:12    (Rec: 04/19/21 10:17    [id number])


 Nutrition Notes


     Need for Assessment generated from:         RN Referral


     Initial or Follow up                        Assessment


     Current Diagnosis                           Stroke


     Other Pertinent Diagnosis                   AMS, seizure


     Current Diet                                Pureed, cardiac


     Labs/Tests                                  4/18:


                                                 K 3.5


     Pertinent Medications                       NS at 125 ml/hr


     Height                                      5 ft 8 in


     Weight                                      73.6 kg


     Ideal Body Weight (kg)                      70.00


     BMI                                         24.6


     Weight Status                               Appropriate


     Subjective/Other Information                RN screen for skin risk.


                                                 Jesús score 13. Pt unable to


                                                 communicate. Per RN, pt


                                                 refusing to eat or drink. Will


                                                 give ONS if pt open to


                                                 drinking something.


     Burn                                        Absent


     Trauma                                      Absent


     Current % PO                                Negligible


     Minimum of two criteria                     No physical signs of


                                                 malnutrition


     #1


      Nutrition Diagnosis                        Inadequate oral intake


      Etiology                                   AMS


      As Evidenced by Signs and Symptoms         pt refusing food/drink


     Is patient on ventilator?                   No


     Is Patient Ambulatory and/or Out of Bed     No


     REE-([facility]-confined to bed)      [phone]


     Calculation Used for Recommendations        [facility]


     Additional Notes                            Protein: (0.8-1g/kg) 59-74g


                                                 Fluid: 1 ml/kcal


 Nutrition Intervention


     Change Diet Order:                          Continue


     Add Supplement/Snack (indicate name/kcal    Ensure Enlive daily


      /protein )                                 


     Provides kCal:                              350


     Provides Protein (gm)                       20


     Goal #1                                     Meet at least 75% of protein


                                                 and energy needs via PO and


                                                 ONS intakes


     Anticipated Discharge Needs:                Pureed with ONS PRN


     Follow-Up By:                               04/21/21


     Additional Comments                         FU for intakes and ONS


                                                 tolerance

## 2021-04-21 RX ADMIN — LISINOPRIL SCH MG: 10 TABLET ORAL at 10:10

## 2021-04-21 RX ADMIN — ESCITALOPRAM OXALATE SCH MG: 10 TABLET ORAL at 10:10

## 2021-04-21 RX ADMIN — MIRTAZAPINE SCH MG: 15 TABLET, FILM COATED ORAL at 22:56

## 2021-04-21 RX ADMIN — FOLIC ACID SCH MG: 1 TABLET ORAL at 10:10

## 2021-04-21 RX ADMIN — ASPIRIN SCH MG: 325 TABLET ORAL at 10:09

## 2021-04-21 RX ADMIN — Medication SCH MG: at 10:10

## 2021-04-21 RX ADMIN — Medication SCH ML: at 10:10

## 2021-04-21 RX ADMIN — Medication SCH ML: at 22:57

## 2021-04-21 RX ADMIN — MULTIVITAMIN TABLET SCH EACH: TABLET at 10:10

## 2021-04-21 RX ADMIN — LEVETIRACETAM SCH MLS/HR: 100 INJECTION, SOLUTION INTRAVENOUS at 10:10

## 2021-04-21 RX ADMIN — LEVETIRACETAM SCH MLS/HR: 100 INJECTION, SOLUTION INTRAVENOUS at 22:57

## 2021-04-21 NOTE — PROGRESS NOTE
Assessment and Plan


Assessment and plan: 





#Acute metabolic encephalopathy versus delirium


Patient is alert but nonverbal


Psychiatry evaluation appreciated


Need to rule out seizures-MRI brain with and without contrast and EEG ordered





#History of seizures


Keppra


Neurology evaluation appreciated


MRI brain with and without contrast pending


EEG performed - awaiting results. 


Plan for possible transfer pending EEG results.





#Dehydration


Encourage p.o. intake





# Hypokalemia


Replete





DVT prophylaxis on heparin and GI prophylaxis


Patient in restraints











History


Interval history: 


51-year-old male with known history of acute on chronic metabolic 

encephalopathy, depression, speech impairment, seizure disorder, PTSD was 

brought to the emergency room today with altered mental status.  Patient is a 

resident of [facility]-referred of healthcare.  Nursing staff at 

the facility were concerned that patient may have had a seizure.  He was given 

IM Ativan prior to arrival in the emergency room.





Records indicates the patient was admitted last month and was diagnosed with 

acute on chronic metabolic encephalopathy and also PTSD.


Patient is a poor historian, most of the history was gotten from the ER 

physician.


Patient is said to be on multiple medications at the skilled facility.





Work-up in the emergency room today reveals elevated BUN/creatinine ratio.





Patient is being admitted with altered mental status with possible dehydration.





4/16/21


Patient is alert but not oriented


Just staring at me


In restraints


Staring into space





4/17/2021


Patient is alert but not oriented


Just staring at me


In restraints


Staring into space





4/18


Same condition


ALert but not oriented


Staring into space





Addendum I took extensive history from the wife and reviewed the previous 

admission from March 24 to the discharge time which was approximately March 31, 2021.  Patient was apparently walking and dragging his right lower extremity.  

Not using walker.  But patient has been having persistent expressive aphasia.  

Able to do his ADLs ~admission March 23 on March 31 patient was transferred to 

[facility] for rehab.  MRI was negative for any acute subacute stroke.  

Patient was labeled as TIA when he was discharged from [place] but as per wife 

patient was having right-sided deficits which are more consistent with 

cerebrovascular accident but the imaging studies are not showing any infarct.  

Patient has seizures off and on and is on antiepileptics.  Depakote was 

recommended but not carried through.  Will defer to neurology and psychiatry.  

If cleared by psychiatry and neurology will transfer back to [facility] 

with a regular follow-up with neurology and psychiatry as outpatient.  Patient 

is otherwise stable except for a low potassium which is being supplemented.





4/19


As per psych--no catatonia


Neuro consult appreciated


Ordered MRI agai


Recommended EEG





4/20.


Patient is nonverbal this AM.


He was staring at me.  He subsequently burst into laughter as I tried to listen 

to his chest.


Psychiatry has already evaluated patient


MRI brain with and without contrast and EEG pending


Neurology following





4/21. EEG performed. Awaiting results. Neurology to see. Could not get MRI brain

yesterday. Will reattempt today.








Hospitalist Physical





- Physical exam


Narrative exam: 





VITAL SIGNS:  Reviewed.    


GENERAL: Awake


HEAD:  No signs of head trauma.


EYES:  Pupils are equal.  Extraocular motions intact.  


MOUTH:  Oropharynx is normal. 


NECK:  No adenopathy, no JVD.  


CHEST:  Chest with diminished breath sounds bilaterally.  No wheezes, rales, or 

rhonchi.  


CARDIAC:  normal S1 and S2, without murmurs, gallops, or rubs.


ABDOMEN:  Soft, non tender and non distended.  No   rebound or guarding, and no 

masses palpated.   Bowel Sounds normal.


MUSCULOSKELETAL:  No edema


NEUROLOGIC EXAM: Alert but nonverbal.  No new focal neurologic deficits noted.


Psych exam: Nonverbal


SKIN: No obvious lesions








- Constitutional


Vitals: 


                                        











Temp Pulse Resp BP Pulse Ox


 


 98.1 F   98 H  17   135/76   99 


 


 04/21/21 16:59  04/21/21 16:59  04/21/21 16:59  04/21/21 16:59  04/21/21 16:59














Results





- Labs


CBC & Chem 7: 


                                 04/18/21 05:33





                                 04/18/21 05:33


Labs: 


                             Laboratory Last Values











WBC  11.4 K/mm3 (4.5-11.0)  H  04/18/21  05:33    


 


RBC  3.31 M/mm3 (3.65-5.03)  L  04/18/21  05:33    


 


Hgb  10.9 gm/dl (11.8-15.2)  L  04/18/21  05:33    


 


Hct  32.1 % (35.5-45.6)  L  04/18/21  05:33    


 


MCV  97 fl (84-94)  H  04/18/21  05:33    


 


MCH  33 pg (28-32)  H  04/18/21  05:33    


 


MCHC  34 % (32-34)   04/18/21  05:33    


 


RDW  13.1 % (13.2-15.2)  L  04/18/21  05:33    


 


Plt Count  131 K/mm3 (140-440)  L  04/18/21  05:33    


 


Lymph % (Auto)  19.4 % (13.4-35.0)   04/18/21  05:33    


 


Mono % (Auto)  7.2 % (0.0-7.3)   04/18/21  05:33    


 


Eos % (Auto)  0.5 % (0.0-4.3)   04/18/21  05:33    


 


Baso % (Auto)  0.6 % (0.0-1.8)   04/18/21  05:33    


 


Lymph # (Auto)  2.2 K/mm3 (1.2-5.4)   04/18/21  05:33    


 


Mono # (Auto)  0.8 K/mm3 (0.0-0.8)   04/18/21  05:33    


 


Eos # (Auto)  0.1 K/mm3 (0.0-0.4)   04/18/21  05:33    


 


Baso # (Auto)  0.1 K/mm3 (0.0-0.1)   04/18/21  05:33    


 


Seg Neutrophils %  72.3 % (40.0-70.0)  H  04/18/21  05:33    


 


Seg Neutrophils #  8.2 K/mm3 (1.8-7.7)  H  04/18/21  05:33    


 


PT  14.6 Sec. (12.2-14.9)   04/16/21  04:50    


 


INR  1.16  (0.87-1.13)  H  04/16/21  04:50    


 


Sodium  143 mmol/L (137-145)   04/18/21  05:33    


 


Potassium  3.5 mmol/L (3.6-5.0)  L  04/18/21  05:33    


 


Chloride  109.8 mmol/L ()  H  04/18/21  05:33    


 


Carbon Dioxide  25 mmol/L (22-30)   04/18/21  05:33    


 


Anion Gap  12 mmol/L  04/18/21  05:33    


 


BUN  15 mg/dL (9-20)   04/18/21  05:33    


 


Creatinine  0.7 mg/dL (0.8-1.3)  L  04/18/21  05:33    


 


Estimated GFR  > 60 ml/min  04/18/21  05:33    


 


BUN/Creatinine Ratio  21 %  04/18/21  05:33    


 


Glucose  91 mg/dL ()   04/18/21  05:33    


 


POC Glucose  84 mg/dL ()   04/18/21  17:51    


 


Calcium  8.6 mg/dL (8.4-10.2)   04/18/21  05:33    


 


Total Bilirubin  0.80 mg/dL (0.1-1.2)   04/18/21  05:33    


 


AST  30 units/L (5-40)   04/18/21  05:33    


 


ALT  15 units/L (7-56)   04/18/21  05:33    


 


Alkaline Phosphatase  64 units/L ()   04/18/21  05:33    


 


Total Protein  5.5 g/dL (6.3-8.2)  L D 04/18/21  05:33    


 


Albumin  3.3 g/dL (3.9-5)  L  04/18/21  05:33    


 


Albumin/Globulin Ratio  1.5 %  04/18/21  05:33    


 


TSH  0.709 mlU/mL (0.270-4.200)   04/15/21  18:13    


 


Urine Color  Yellow  (Yellow)   04/15/21  21:28    


 


Urine Turbidity  Clear  (Clear)   04/15/21  21:28    


 


Urine pH  6.0  (5.0-7.0)   04/15/21  21:28    


 


Ur Specific Gravity  1.026  (1.003-1.030)   04/15/21  21:28    


 


Urine Protein  30 mg/dl mg/dL (Negative)   04/15/21  21:28    


 


Urine Glucose (UA)  Neg mg/dL (Negative)   04/15/21  21:28    


 


Urine Ketones  80 mg/dL (Negative)   04/15/21  21:28    


 


Urine Blood  Neg  (Negative)   04/15/21  21:28    


 


Urine Nitrite  Neg  (Negative)   04/15/21  21:28    


 


Urine Bilirubin  Neg  (Negative)   04/15/21  21:28    


 


Urine Urobilinogen  4.0 mg/dL (<2.0)   04/15/21  21:28    


 


Ur Leukocyte Esterase  Neg  (Negative)   04/15/21  21:28    


 


Urine WBC (Auto)  1.0 /HPF (0.0-6.0)   04/15/21  21:28    


 


Urine RBC (Auto)  < 1.0 /HPF (0.0-6.0)   04/15/21  21:28    


 


U Epithel Cells (Auto)  < 1.0 /HPF (0-13.0)   04/15/21  21:28    


 


Urine Mucus  2+ /HPF  04/15/21  21:28    











Gamble/IV: 


                                        





Voiding Method                   Condom Catheter











Active Medications





- Current Medications


Current Medications: 














Generic Name Dose Route Start Last Admin





  Trade Name Freq  PRN Reason Stop Dose Admin


 


Acetaminophen  650 mg  04/15/21 22:12  04/17/21 09:44





  Acetaminophen 325 Mg Tab  PO   650 mg





  Q4H PRN   Administration





  Pain MILD(1-3)/Fever >100.5/HA  


 


Aspirin  325 mg  04/16/21 10:00  04/21/21 10:09





  Aspirin 325 Mg Tab  PO   325 mg





  QDAY FAIZA   Administration


 


Atorvastatin Calcium  40 mg  04/16/21 22:00  04/20/21 22:42





  Atorvastatin 40 Mg Tab  PO   40 mg





  QHS FAIZA   Administration


 


Escitalopram Oxalate  10 mg  04/16/21 10:00  04/21/21 10:10





  Escitalopram 10 Mg Tab  PO   10 mg





  DAILY FAIZA   Administration


 


Folic Acid  1 mg  04/16/21 10:00  04/21/21 10:10





  Folic Acid 1 Mg Tab  PO   1 mg





  QDAY FAIZA   Administration


 


Hydrochlorothiazide  12.5 mg  04/17/21 13:00  04/21/21 10:10





  Hydrochlorothiazide 12.5 Mg Cap  PO   12.5 mg





  QDAY FAIZA   Administration


 


Sodium Chloride  1,000 mls @ 125 mls/hr  04/15/21 23:15  04/20/21 15:30





  Nacl 0.9% 1000 Ml  IV   125 mls/hr





  AS DIRECT FAIZA   Administration


 


Levetiracetam 500 mg/ Dextrose  105 mls @ 400 mls/hr  04/16/21 10:00  04/21/21 

10:10





  IV  04/21/21 23:59  400 mls/hr





  Q12HR FAIZA   Administration


 


Levetiracetam  500 mg  04/22/21 10:00 





  Levetiracetam 500 Mg/5 Ml Oral Liqd  PO  





  BID FAIZA  


 


Lisinopril  10 mg  04/17/21 13:00  04/21/21 10:10





  Lisinopril 10 Mg Tab  PO   10 mg





  QDAY FAIZA   Administration


 


Lorazepam  1 mg  04/17/21 21:37  04/20/21 12:56





  Lorazepam 2 Mg/Ml Vial  IV   1 mg





  Q4H PRN   Administration





  Seizures  


 


Lorazepam  1 mg  04/17/21 21:41  04/21/21 15:14





  Lorazepam 2 Mg/Ml Vial  IV   1 mg





  Q4H PRN   Administration





  Agitation  


 


Magnesium Hydroxide  30 ml  04/15/21 23:03 





  Magnesium Hydroxide (Mom) Oral Liqd Udc  PO  





  Q4H PRN  





  Constipation  


 


Mirtazapine  7.5 mg  04/16/21 22:00  04/20/21 22:42





  Mirtazapine 15 Mg Tab  PO   7.5 mg





  QHS FAIZA   Administration


 


Morphine Sulfate  2 mg  04/15/21 23:03  04/17/21 15:55





  Morphine 2 Mg/1 Ml Inj  IV   2 mg





  Q4H PRN   Administration





  Pain, Moderate (4-6)  


 


Multivitamins  1 each  04/16/21 10:00  04/21/21 10:10





  Multivitamins ,Therapeutic Tab  PO   1 each





  QDAY FAIZA   Administration


 


Ondansetron HCl  4 mg  04/15/21 22:12 





  Ondansetron 4 Mg/2 Ml Inj  IV  





  Q8H PRN  





  Nausea And Vomiting  


 


Sodium Chloride  10 ml  04/16/21 10:00  04/21/21 10:10





  Sodium Chloride 0.9% 10 Ml Flush Syringe  IV   10 ml





  BID FAIZA   Administration


 


Sodium Chloride  10 ml  04/15/21 22:12  04/19/21 21:31





  Sodium Chloride 0.9% 10 Ml Flush Syringe  IV   10 ml





  PRN PRN   Administration





  LINE FLUSH  


 


Thiamine HCl  100 mg  04/16/21 10:00  04/21/21 10:10





  Thiamine 100 Mg Tab  PO   100 mg





  QDAY FAIZA   Administration














Nutrition/Malnutrition Assess





- Dietary Evaluation


Nutrition/Malnutrition Findings: 


                                        





Nutrition Notes                                            Start:  04/19/21 

10:12


Freq:                                                      Status: Active       




Protocol:                                                                       




 Document     04/21/21 11:04  CW  (Rec: 04/21/21 11:13  CW  [id number])


 Nutrition Notes


     Initial or Follow up                        Reassessment


     Current Diagnosis                           Stroke


     Other Pertinent Diagnosis                   AMS, seizure


     Current Diet                                Pureed, cardiac


     Labs/Tests                                  K 3.5


     Pertinent Medications                       remeron


                                                 NS at 125 ml/hr


     Height                                      5 ft 8 in


     Weight                                      70.8 kg


     Ideal Body Weight (kg)                      70.00


     BMI                                         23.7


     Weight change and time frame                -3.6% x 7 days; weight has


                                                 tendency to fluctuate around


                                                 this weight


     Weight Status                               Appropriate


     Subjective/Other Information                F/U for intakes. Pt remains


                                                 nonverbal. Per RN and tech, PO


                                                 intake today is neglible. Pt


                                                 recieves feeding assistance.


                                                 Recommend continued encourage


                                                 during meals. Alternate


                                                 feeding route may be necessary


                                                 d/t poor PO intake x5 days.


     Percent of energy/protein needs met:        0%/0%


     Burn                                        Absent


     Trauma                                      Absent


     Current % PO                                Negligible


     Minimum of two criteria                     No


     Energy Intake (severe)                      < or equal to 50% Estimated


                                                 Energy Requirement > or equal


                                                 to 5 days


     #1


      Nutrition Diagnosis                        Inadequate oral intake


      Diagnosis Progress(for reassessment        Continues


       documentation)                            


     Is Patient Ambulatory and/or Out of Bed     No


     REE-([place] Jeor-confined to bed)      [phone]


     Calculation Used for Recommendations        [facility]


     Additional Notes                            Protein: (0.8-1g/kg) 59-74g


                                                 Fluid: 1 ml/kcal


 Nutrition Intervention


     Change Diet Order:                          Continue


     Add Supplement/Snack (indicate name/kcal    Ensure Enlive daily


      /protein )                                 


     Provides kCal:                              350


     Provides Protein (gm)                       20


     Goal #1                                     Meet at least 75% of protein


                                                 and energy needs via PO and


                                                 ONS intakes


     Anticipated Discharge Needs:                Pureed with ONS PRN


     Follow-Up By:                               04/23/21


     Additional Comments                         F/U for intakes, ONS tolerance


                                                 , and POC

## 2021-04-22 LAB
ALBUMIN SERPL-MCNC: 3.7 G/DL (ref 3.9–5)
ALT SERPL-CCNC: 17 UNITS/L (ref 7–56)
APTT BLD: 32.4 SEC. (ref 24.2–36.6)
BASOPHILS # (AUTO): 0.1 K/MM3 (ref 0–0.1)
BASOPHILS NFR BLD AUTO: 0.7 % (ref 0–1.8)
BUN SERPL-MCNC: 15 MG/DL (ref 9–20)
BUN/CREAT SERPL: 21 %
CALCIUM SERPL-MCNC: 9.2 MG/DL (ref 8.4–10.2)
EOSINOPHIL # BLD AUTO: 0 K/MM3 (ref 0–0.4)
EOSINOPHIL NFR BLD AUTO: 0 % (ref 0–4.3)
HCT VFR BLD CALC: 36.4 % (ref 35.5–45.6)
HEMOLYSIS INDEX: 2
HGB BLD-MCNC: 12.8 GM/DL (ref 11.8–15.2)
INR PPP: 1.16 (ref 0.87–1.13)
IRON SERPL-MCNC: 27 UG/DL (ref 49–181)
LYMPHOCYTES # BLD AUTO: 1.8 K/MM3 (ref 1.2–5.4)
LYMPHOCYTES NFR BLD AUTO: 17.3 % (ref 13.4–35)
MCHC RBC AUTO-ENTMCNC: 35 % (ref 32–34)
MCV RBC AUTO: 95 FL (ref 84–94)
MONOCYTES # (AUTO): 0.9 K/MM3 (ref 0–0.8)
MONOCYTES % (AUTO): 8.9 % (ref 0–7.3)
PLATELET # BLD: 196 K/MM3 (ref 140–440)
RBC # BLD AUTO: 3.83 M/MM3 (ref 3.65–5.03)
TIBC SERPL-MCNC: 179 MCG/DL (ref 250–450)

## 2021-04-22 RX ADMIN — DEXTROSE AND SODIUM CHLORIDE SCH MLS/HR: 5; .45 INJECTION, SOLUTION INTRAVENOUS at 14:30

## 2021-04-22 RX ADMIN — FOLIC ACID SCH: 1 TABLET ORAL at 09:25

## 2021-04-22 RX ADMIN — MULTIVITAMIN TABLET SCH EACH: TABLET at 10:52

## 2021-04-22 RX ADMIN — LEVETIRACETAM SCH MG: 500 SOLUTION ORAL at 10:52

## 2021-04-22 RX ADMIN — ESCITALOPRAM OXALATE SCH MG: 10 TABLET ORAL at 10:52

## 2021-04-22 RX ADMIN — Medication SCH MG: at 10:52

## 2021-04-22 RX ADMIN — ASPIRIN SCH MG: 325 TABLET ORAL at 10:52

## 2021-04-22 RX ADMIN — LISINOPRIL SCH: 10 TABLET ORAL at 09:28

## 2021-04-22 RX ADMIN — FOLIC ACID SCH MG: 1 TABLET ORAL at 10:52

## 2021-04-22 RX ADMIN — LEVETIRACETAM SCH MLS/HR: 100 INJECTION, SOLUTION INTRAVENOUS at 22:23

## 2021-04-22 RX ADMIN — ESCITALOPRAM OXALATE SCH: 10 TABLET ORAL at 09:26

## 2021-04-22 RX ADMIN — LISINOPRIL SCH MG: 10 TABLET ORAL at 10:53

## 2021-04-22 RX ADMIN — Medication SCH ML: at 10:53

## 2021-04-22 RX ADMIN — MULTIVITAMIN TABLET SCH: TABLET at 09:27

## 2021-04-22 RX ADMIN — Medication SCH ML: at 22:24

## 2021-04-22 RX ADMIN — Medication SCH: at 09:27

## 2021-04-22 RX ADMIN — LEVETIRACETAM SCH MLS/HR: 100 INJECTION, SOLUTION INTRAVENOUS at 12:06

## 2021-04-22 NOTE — PROGRESS NOTE
Assessment and Plan


Assessment and plan: 





#Acute metabolic encephalopathy versus delirium


Patient is alert but nonverbal


Psychiatry evaluation appreciated


MRI brain is negative for acute pathology. EEG is pending. 


Doubt NMS as he has no classical signs of fever or muscular rigidity.  Continue 

to hold Haldol.  Hold antipsychotics


Plan to get a CSF analysis to rule out any other infectious pathology.


Will repeat his labs.  Ordered CK, CRP, LFTs iron panel





#CVA ruled out


MRI and CT head negative for CVA





#History of seizures


Continue Kera


Neurology evaluation appreciated


MRI brain with and without contrast negative for acute pathology


EEG performed - awaiting results. 








#Dehydration


Encourage p.o. intake





# Hypokalemia


Repleted





DVT prophylaxis on heparin and GI prophylaxis














History


Interval history: 


51-year-old male with known history of acute on chronic metabolic 

encephalopathy, depression, speech impairment, seizure disorder, PTSD was 

brought to the emergency room today with altered mental status.  Patient is a 

resident of [facility] nursing facility-referred of healthcare.  Nursing staff at 

the facility were concerned that patient may have had a seizure.  He was given 

IM Ativan prior to arrival in the emergency room.





Records indicates the patient was admitted last month and was diagnosed with 

acute on chronic metabolic encephalopathy and also PTSD.


Patient is a poor historian, most of the history was gotten from the ER 

physician.


Patient is said to be on multiple medications at the skilled facility.





Work-up in the emergency room today reveals elevated BUN/creatinine ratio.





Patient is being admitted with altered mental status with possible dehydration.





4/16/21


Patient is alert but not oriented


Just staring at me


In restraints


Staring into space





4/17/2021


Patient is alert but not oriented


Just staring at me


In restraints


Staring into space





4/18


Same condition


ALert but not oriented


Staring into space





Addendum I took extensive history from the wife and reviewed the previous 

admission from March 24 to the discharge time which was approximately March 31, 2021.  Patient was apparently walking and dragging his right lower extremity.  

Not using walker.  But patient has been having persistent expressive aphasia.  

Able to do his ADLs ~admission March 23 on March 31 patient was transferred to 

[facility] for rehab.  MRI was negative for any acute subacute stroke.  

Patient was labeled as TIA when he was discharged from [place] but as per wife 

patient was having right-sided deficits which are more consistent with 

cerebrovascular accident but the imaging studies are not showing any infarct.  

Patient has seizures off and on and is on antiepileptics.  Depakote was 

recommended but not carried through.  Will defer to neurology and psychiatry.  

If cleared by psychiatry and neurology will transfer back to [facility] 

with a regular follow-up with neurology and psychiatry as outpatient.  Patient 

is otherwise stable except for a low potassium which is being supplemented.





4/19


As per psych--no catatonia


Neuro consult appreciated


Ordered MRI agai


Recommended EEG





4/20.


Patient is nonverbal this AM.


He was staring at me.  He subsequently burst into laughter as I tried to listen 

to his chest.


Psychiatry has already evaluated patient


MRI brain with and without contrast and EEG pending


Neurology following





4/21. EEG performed. Awaiting results. Neurology to see. Could not get MRI brain

yesterday. Will reattempt today. Holding his haldol 





4/22. MRI brain is negative for any acute pathology. EEG unofficial read - shows

possible encephalopathy. Awaiting neurology reeval.  Updated patient's wife














Hospitalist Physical





- Constitutional


Vitals: 


                                        











Temp Pulse Resp BP Pulse Ox


 


 98.3 F   80   18   142/83   100 


 


 04/22/21 07:51  04/22/21 07:51  04/22/21 07:51  04/22/21 07:51  04/22/21 07:51











General appearance: Present: no acute distress, well-nourished





Results





- Labs


CBC & Chem 7: 


                                 04/23/21 04:25





                                 04/23/21 04:25


Labs: 


                             Laboratory Last Values











WBC  11.4 K/mm3 (4.5-11.0)  H  04/18/21  05:33    


 


RBC  3.31 M/mm3 (3.65-5.03)  L  04/18/21  05:33    


 


Hgb  10.9 gm/dl (11.8-15.2)  L  04/18/21  05:33    


 


Hct  32.1 % (35.5-45.6)  L  04/18/21  05:33    


 


MCV  97 fl (84-94)  H  04/18/21  05:33    


 


MCH  33 pg (28-32)  H  04/18/21  05:33    


 


MCHC  34 % (32-34)   04/18/21  05:33    


 


RDW  13.1 % (13.2-15.2)  L  04/18/21  05:33    


 


Plt Count  131 K/mm3 (140-440)  L  04/18/21  05:33    


 


Lymph % (Auto)  19.4 % (13.4-35.0)   04/18/21  05:33    


 


Mono % (Auto)  7.2 % (0.0-7.3)   04/18/21  05:33    


 


Eos % (Auto)  0.5 % (0.0-4.3)   04/18/21  05:33    


 


Baso % (Auto)  0.6 % (0.0-1.8)   04/18/21  05:33    


 


Lymph # (Auto)  2.2 K/mm3 (1.2-5.4)   04/18/21  05:33    


 


Mono # (Auto)  0.8 K/mm3 (0.0-0.8)   04/18/21  05:33    


 


Eos # (Auto)  0.1 K/mm3 (0.0-0.4)   04/18/21  05:33    


 


Baso # (Auto)  0.1 K/mm3 (0.0-0.1)   04/18/21  05:33    


 


Seg Neutrophils %  72.3 % (40.0-70.0)  H  04/18/21  05:33    


 


Seg Neutrophils #  8.2 K/mm3 (1.8-7.7)  H  04/18/21  05:33    


 


PT  14.6 Sec. (12.2-14.9)   04/16/21  04:50    


 


INR  1.16  (0.87-1.13)  H  04/16/21  04:50    


 


Sodium  143 mmol/L (137-145)   04/18/21  05:33    


 


Potassium  3.5 mmol/L (3.6-5.0)  L  04/18/21  05:33    


 


Chloride  109.8 mmol/L ()  H  04/18/21  05:33    


 


Carbon Dioxide  25 mmol/L (22-30)   04/18/21  05:33    


 


Anion Gap  12 mmol/L  04/18/21  05:33    


 


BUN  15 mg/dL (9-20)   04/18/21  05:33    


 


Creatinine  0.7 mg/dL (0.8-1.3)  L  04/18/21  05:33    


 


Estimated GFR  > 60 ml/min  04/18/21  05:33    


 


BUN/Creatinine Ratio  21 %  04/18/21  05:33    


 


Glucose  91 mg/dL ()   04/18/21  05:33    


 


POC Glucose  84 mg/dL ()   04/18/21  17:51    


 


Calcium  8.6 mg/dL (8.4-10.2)   04/18/21  05:33    


 


Total Bilirubin  0.80 mg/dL (0.1-1.2)   04/18/21  05:33    


 


AST  30 units/L (5-40)   04/18/21  05:33    


 


ALT  15 units/L (7-56)   04/18/21  05:33    


 


Alkaline Phosphatase  64 units/L ()   04/18/21  05:33    


 


Total Protein  5.5 g/dL (6.3-8.2)  L D 04/18/21  05:33    


 


Albumin  3.3 g/dL (3.9-5)  L  04/18/21  05:33    


 


Albumin/Globulin Ratio  1.5 %  04/18/21  05:33    


 


TSH  0.709 mlU/mL (0.270-4.200)   04/15/21  18:13    


 


Urine Color  Yellow  (Yellow)   04/15/21  21:28    


 


Urine Turbidity  Clear  (Clear)   04/15/21  21:28    


 


Urine pH  6.0  (5.0-7.0)   04/15/21  21:28    


 


Ur Specific Gravity  1.026  (1.003-1.030)   04/15/21  21:28    


 


Urine Protein  30 mg/dl mg/dL (Negative)   04/15/21  21:28    


 


Urine Glucose (UA)  Neg mg/dL (Negative)   04/15/21  21:28    


 


Urine Ketones  80 mg/dL (Negative)   04/15/21  21:28    


 


Urine Blood  Neg  (Negative)   04/15/21  21:28    


 


Urine Nitrite  Neg  (Negative)   04/15/21  21:28    


 


Urine Bilirubin  Neg  (Negative)   04/15/21  21:28    


 


Urine Urobilinogen  4.0 mg/dL (<2.0)   04/15/21  21:28    


 


Ur Leukocyte Esterase  Neg  (Negative)   04/15/21  21:28    


 


Urine WBC (Auto)  1.0 /HPF (0.0-6.0)   04/15/21  21:28    


 


Urine RBC (Auto)  < 1.0 /HPF (0.0-6.0)   04/15/21  21:28    


 


U Epithel Cells (Auto)  < 1.0 /HPF (0-13.0)   04/15/21  21:28    


 


Urine Mucus  2+ /HPF  04/15/21  21:28    











Gamble/IV: 


                                        





Voiding Method                   Condom Catheter











Active Medications





- Current Medications


Current Medications: 














Generic Name Dose Route Start Last Admin





  Trade Name Freq  PRN Reason Stop Dose Admin


 


Acetaminophen  650 mg  04/15/21 22:12  04/17/21 09:44





  Acetaminophen 325 Mg Tab  PO   650 mg





  Q4H PRN   Administration





  Pain MILD(1-3)/Fever >100.5/HA  


 


Aspirin  325 mg  04/16/21 10:00  04/21/21 10:09





  Aspirin 325 Mg Tab  PO   325 mg





  QDAY FAIZA   Administration


 


Atorvastatin Calcium  40 mg  04/16/21 22:00  04/21/21 22:56





  Atorvastatin 40 Mg Tab  PO   40 mg





  QHS FAIZA   Administration


 


Escitalopram Oxalate  10 mg  04/16/21 10:00  04/21/21 10:10





  Escitalopram 10 Mg Tab  PO   10 mg





  DAILY FAIZA   Administration


 


Folic Acid  1 mg  04/16/21 10:00  04/21/21 10:10





  Folic Acid 1 Mg Tab  PO   1 mg





  QDAY FAIZA   Administration


 


Hydrochlorothiazide  12.5 mg  04/17/21 13:00  04/21/21 10:10





  Hydrochlorothiazide 12.5 Mg Cap  PO   12.5 mg





  QDAY FAIZA   Administration


 


Sodium Chloride  1,000 mls @ 125 mls/hr  04/15/21 23:15  04/20/21 15:30





  Nacl 0.9% 1000 Ml  IV   125 mls/hr





  AS DIRECT FAIZA   Administration


 


Levetiracetam  500 mg  04/22/21 10:00 





  Levetiracetam 500 Mg/5 Ml Oral Liqd  PO  





  BID FAIZA  


 


Lisinopril  10 mg  04/17/21 13:00  04/21/21 10:10





  Lisinopril 10 Mg Tab  PO   10 mg





  QDAY FAIZA   Administration


 


Lorazepam  1 mg  04/17/21 21:37  04/22/21 07:55





  Lorazepam 2 Mg/Ml Vial  IV   1 mg





  Q4H PRN   Administration





  Seizures  


 


Lorazepam  2 mg  04/22/21 12:00 





  Lorazepam 2 Mg/Ml Vial  IV  





  Q4H PRN  





  Agitation  


 


Magnesium Hydroxide  30 ml  04/15/21 23:03 





  Magnesium Hydroxide (Mom) Oral Liqd Udc  PO  





  Q4H PRN  





  Constipation  


 


Mirtazapine  7.5 mg  04/16/21 22:00  04/21/21 22:56





  Mirtazapine 15 Mg Tab  PO   7.5 mg





  QHS FAIZA   Administration


 


Morphine Sulfate  2 mg  04/15/21 23:03  04/17/21 15:55





  Morphine 2 Mg/1 Ml Inj  IV   2 mg





  Q4H PRN   Administration





  Pain, Moderate (4-6)  


 


Multivitamins  1 each  04/16/21 10:00  04/21/21 10:10





  Multivitamins ,Therapeutic Tab  PO   1 each





  QDAY FAIZA   Administration


 


Ondansetron HCl  4 mg  04/15/21 22:12 





  Ondansetron 4 Mg/2 Ml Inj  IV  





  Q8H PRN  





  Nausea And Vomiting  


 


Sodium Chloride  10 ml  04/16/21 10:00  04/21/21 22:57





  Sodium Chloride 0.9% 10 Ml Flush Syringe  IV   10 ml





  BID FAIZA   Administration


 


Sodium Chloride  10 ml  04/15/21 22:12  04/19/21 21:31





  Sodium Chloride 0.9% 10 Ml Flush Syringe  IV   10 ml





  PRN PRN   Administration





  LINE FLUSH  


 


Thiamine HCl  100 mg  04/16/21 10:00  04/21/21 10:10





  Thiamine 100 Mg Tab  PO   100 mg





  QDAY FAIZA   Administration














Nutrition/Malnutrition Assess





- Dietary Evaluation


Nutrition/Malnutrition Findings: 


                                        





Nutrition Notes                                            Start:  04/19/21 

10:12


Freq:                                                      Status: Active       




Protocol:                                                                       




 Document     04/21/21 11:04  CW  (Rec: 04/21/21 11:13  CW  [id number])


 Nutrition Notes


     Initial or Follow up                        Reassessment


     Current Diagnosis                           Stroke


     Other Pertinent Diagnosis                   AMS, seizure


     Current Diet                                Pureed, cardiac


     Labs/Tests                                  K 3.5


     Pertinent Medications                       remeron


                                                 NS at 125 ml/hr


     Height                                      5 ft 8 in


     Weight                                      70.8 kg


     Ideal Body Weight (kg)                      70.00


     BMI                                         23.7


     Weight change and time frame                -3.6% x 7 days; weight has


                                                 tendency to fluctuate around


                                                 this weight


     Weight Status                               Appropriate


     Subjective/Other Information                F/U for intakes. Pt remains


                                                 nonverbal. Per RN and tech, PO


                                                 intake today is neglible. Pt


                                                 recieves feeding assistance.


                                                 Recommend continued encourage


                                                 during meals. Alternate


                                                 feeding route may be necessary


                                                 d/t poor PO intake x5 days.


     Percent of energy/protein needs met:        0%/0%


     Burn                                        Absent


     Trauma                                      Absent


     Current % PO                                Negligible


     Minimum of two criteria                     No


     Energy Intake (severe)                      < or equal to 50% Estimated


                                                 Energy Requirement > or equal


                                                 to 5 days


     #1


      Nutrition Diagnosis                        Inadequate oral intake


      Diagnosis Progress(for reassessment        Continues


       documentation)                            


     Is Patient Ambulatory and/or Out of Bed     No


     REE-([facility]-confined to bed)      [phone]


     Calculation Used for Recommendations        [facility]


     Additional Notes                            Protein: (0.8-1g/kg) 59-74g


                                                 Fluid: 1 ml/kcal


 Nutrition Intervention


     Change Diet Order:                          Continue


     Add Supplement/Snack (indicate name/kcal    Ensure Enlive daily


      /protein )                                 


     Provides kCal:                              350


     Provides Protein (gm)                       20


     Goal #1                                     Meet at least 75% of protein


                                                 and energy needs via PO and


                                                 ONS intakes


     Anticipated Discharge Needs:                Pureed with ONS PRN


     Follow-Up By:                               04/23/21


     Additional Comments                         F/U for intakes, ONS tolerance


                                                 , and POC

## 2021-04-22 NOTE — MAGNETIC RESONANCE REPORT
MRI BRAIN 4/22/2021



INDICATION / CLINICAL INFORMATION:

MAIN. Neoplasm vs. Acute/Subacute Stroke Patient motion, best possible exam



TECHNIQUE: 

Multiplanar, multisequence MR images of the brain were obtained.



COMPARISON: 

MRI brain 3/26/2021



FINDINGS:



BRAIN / INTRACRANIAL CONTENTS: Unenhanced and enhanced MR images of the brain were obtained and breanne
red to the recent prior exam from 3/26/2021. There is been no change.



There is no evidence of acute abnormality. Ventricles and sulci are normal in size and shape.



There is no evidence of ischemic injury, demyelination, hemorrhage, or mass. There are no abnormal ex
tra-axial fluid collections.



Postcontrast images demonstrate no abnormal contrast enhancement.



 



EXTRACRANIAL: Unremarkable



CRANIOCERVICAL JUNCTION: No significant abnormality.



VASCULAR FLOW-VOIDS: No significant abnormality.









IMPRESSION:

 No acute abnormality. No significant abnormality. No change when compared to 3/26/2021. 



Signer Name: Phill Olvera MD 

Signed: 4/22/2021 10:32 AM

Workstation Name: [id number]

## 2021-04-22 NOTE — CAT SCAN REPORT
CT head/brain wo con



INDICATION:

CVA.



TECHNIQUE: Routine CT head. All CT scans at this location are performed using CT dose reduction for A
FAYE by means of automated exposure control.



COMPARISON: 

April 15, 2021.



FINDINGS:



Intracranial: Gray-white matter differentiation is maintained. No intracranial hemorrhage. No extra a
xial collection. No hydrocephalus. No herniation.

Sinuses: Mucosal retention cyst in left maxillary sinus. Paranasal sinuses and mastoid air cells are 
essentially clear.

Orbits: Globes are intact. 

Calvarium: No acute fracture.





IMPRESSION:

1.  No acute intracranial abnormality.



Signer Name: Alexis Colvin MD 

Signed: 4/22/2021 8:57 AM

Workstation Name: [id number]

## 2021-04-22 NOTE — PROGRESS NOTE
Assessment and Plan


50 yo male with recent stroke in 2/2021 with bilateral arm weakness with severe 

expressive aphasia, seizure d/o, acute on chronic metabolic encephalopathy, 

depression, PTSD, admitted to the hospitial for notable subacute encephalopathy 

which the wife (at bedside) initially attributed to the haldol that was being 

administered to him at the facility but now with continued encephalopathy.





1.  Metabolic Encephalopathy - workup per primary team, iff EEG and MRI are 

unremarkable for acute findings, recommend CSF evaluation.





2.  Subclinical Seizure - awaiting EEG report.





3.  Hx of Stroke (?recrudescence) - no acute findings on MR Brain; aspirin 81 mg

po qday and statin therapy for a goal ldl of 70.





4. NMS - in the differential diagnosis; consider bromocriptine/dantrolene 

combination to note for any improvement.





5.  Aspiration / Seizure precautions.





Bruno Link MD


Neurology





Subjective


Date of service: 04/22/21


Principal diagnosis: Acute encephalopathy


Interval history: 


Patient underwent MR Brain and EEG.  MR Brain reveals no acute findings.  EEG 

report pending.








Objective





- Vital Sign


                               Vital Signs - 12hr











  04/22/21 04/22/21 04/22/21





  06:20 07:51 08:05


 


Temperature 97.9 F 98.3 F 


 


Pulse Rate 84 80 62


 


Pulse Rate [   





From Monitor]   


 


Respiratory 20 18 





Rate   


 


Blood Pressure 155/89 142/83 


 


O2 Sat by Pulse 100 100 





Oximetry   














  04/22/21 04/22/21 04/22/21





  10:53 11:22 13:00


 


Temperature  98.2 F 


 


Pulse Rate 105 H 69 


 


Pulse Rate [   62





From Monitor]   


 


Respiratory  18 





Rate   


 


Blood Pressure 117/61 118/76 


 


O2 Sat by Pulse  100 100





Oximetry   














- Laboratory Findings


CBC and BMP: 


                                 04/18/21 05:33





                                 04/18/21 05:33


Abnormal Lab Findings: 


                                  Abnormal Labs











  04/15/21 04/15/21 04/16/21





  18:13 18:13 04:50


 


WBC   


 


RBC   


 


Hgb   


 


Hct   


 


MCV  97 H   97 H


 


MCH  33 H   33 H


 


RDW  12.9 L   13.0 L


 


Plt Count   


 


Mono % (Auto)  9.1 H   9.2 H


 


Mono # (Auto)  0.9 H   0.9 H


 


Seg Neutrophils %   


 


Seg Neutrophils #   


 


INR   


 


Sodium   


 


Potassium   3.4 L 


 


Chloride   


 


BUN   34 H 


 


Creatinine   


 


Glucose   107 H 


 


Total Protein   


 


Albumin   














  04/16/21 04/16/21 04/18/21





  04:50 04:50 05:33


 


WBC    11.4 H


 


RBC    3.31 L


 


Hgb    10.9 L


 


Hct    32.1 L


 


MCV    97 H


 


MCH    33 H


 


RDW    13.1 L


 


Plt Count    131 L


 


Mono % (Auto)   


 


Mono # (Auto)   


 


Seg Neutrophils %    72.3 H


 


Seg Neutrophils #    8.2 H


 


INR  1.16 H  


 


Sodium   146 H 


 


Potassium   3.0 L 


 


Chloride   110.1 H 


 


BUN   32 H 


 


Creatinine   


 


Glucose   


 


Total Protein   


 


Albumin   














  04/18/21





  05:33


 


WBC 


 


RBC 


 


Hgb 


 


Hct 


 


MCV 


 


MCH 


 


RDW 


 


Plt Count 


 


Mono % (Auto) 


 


Mono # (Auto) 


 


Seg Neutrophils % 


 


Seg Neutrophils # 


 


INR 


 


Sodium 


 


Potassium  3.5 L


 


Chloride  109.8 H


 


BUN 


 


Creatinine  0.7 L


 


Glucose 


 


Total Protein  5.5 L D


 


Albumin  3.3 L

## 2021-04-23 LAB
ALBUMIN SERPL-MCNC: 3.4 G/DL (ref 3.9–5)
ALT SERPL-CCNC: 14 UNITS/L (ref 7–56)
BASOPHILS # (AUTO): 0.1 K/MM3 (ref 0–0.1)
BASOPHILS NFR BLD AUTO: 0.9 % (ref 0–1.8)
BUN SERPL-MCNC: 16 MG/DL (ref 9–20)
BUN/CREAT SERPL: 23 %
CALCIUM SERPL-MCNC: 8.7 MG/DL (ref 8.4–10.2)
EOSINOPHIL # BLD AUTO: 0.1 K/MM3 (ref 0–0.4)
EOSINOPHIL NFR BLD AUTO: 0.6 % (ref 0–4.3)
GLUCOSE CSF-MCNC: 71 MG/DL
HCT VFR BLD CALC: 35.8 % (ref 35.5–45.6)
HEMOLYSIS INDEX: 7
HGB BLD-MCNC: 12.3 GM/DL (ref 11.8–15.2)
LYMPHOCYTES # BLD AUTO: 1.7 K/MM3 (ref 1.2–5.4)
LYMPHOCYTES NFR BLD AUTO: 18.4 % (ref 13.4–35)
MCHC RBC AUTO-ENTMCNC: 34 % (ref 32–34)
MCV RBC AUTO: 97 FL (ref 84–94)
MONOCYTES # (AUTO): 1 K/MM3 (ref 0–0.8)
MONOCYTES % (AUTO): 10.7 % (ref 0–7.3)
PLATELET # BLD: 175 K/MM3 (ref 140–440)
RBC # BLD AUTO: 3.71 M/MM3 (ref 3.65–5.03)
RED BLOOD CELL,CSF: 1 /MM3 (ref 0–0)
TOTAL CELLS COUNTED: 31 /MM3
WBC # CSF: 2 /MM3 (ref 1–10)

## 2021-04-23 RX ADMIN — Medication SCH: at 12:53

## 2021-04-23 RX ADMIN — ASPIRIN SCH: 300 SUPPOSITORY RECTAL at 12:52

## 2021-04-23 RX ADMIN — POTASSIUM CHLORIDE SCH MLS/HR: 10 INJECTION, SOLUTION INTRAVENOUS at 19:04

## 2021-04-23 RX ADMIN — ESCITALOPRAM OXALATE SCH: 10 TABLET ORAL at 12:53

## 2021-04-23 RX ADMIN — LEVETIRACETAM SCH MLS/HR: 100 INJECTION, SOLUTION INTRAVENOUS at 22:52

## 2021-04-23 RX ADMIN — Medication SCH ML: at 22:50

## 2021-04-23 RX ADMIN — POTASSIUM CHLORIDE SCH MLS/HR: 10 INJECTION, SOLUTION INTRAVENOUS at 16:10

## 2021-04-23 RX ADMIN — FOLIC ACID SCH: 1 TABLET ORAL at 12:52

## 2021-04-23 RX ADMIN — LEVETIRACETAM SCH MLS/HR: 100 INJECTION, SOLUTION INTRAVENOUS at 09:24

## 2021-04-23 RX ADMIN — MULTIVITAMIN TABLET SCH: TABLET at 12:53

## 2021-04-23 RX ADMIN — POTASSIUM CHLORIDE SCH MLS/HR: 10 INJECTION, SOLUTION INTRAVENOUS at 13:01

## 2021-04-23 RX ADMIN — POTASSIUM CHLORIDE SCH MLS/HR: 10 INJECTION, SOLUTION INTRAVENOUS at 13:00

## 2021-04-23 RX ADMIN — POTASSIUM CHLORIDE SCH MLS/HR: 10 INJECTION, SOLUTION INTRAVENOUS at 16:09

## 2021-04-23 RX ADMIN — POTASSIUM CHLORIDE SCH MLS/HR: 10 INJECTION, SOLUTION INTRAVENOUS at 17:34

## 2021-04-23 RX ADMIN — LISINOPRIL SCH: 10 TABLET ORAL at 12:53

## 2021-04-23 RX ADMIN — Medication SCH ML: at 10:34

## 2021-04-23 NOTE — PROGRESS NOTE
Subjective





- Reason for Consult


Consult date: 04/23/21


Reason for consult: AMS





- Chief Complaint


Chief complaint: 


I attempted to interview the patient today, he is lying in bed awake. He is in 

restraints. He doesn't respond to any questions. He just continues to stare at 

me with a blank look.





REVIEW OF SYSTEMS


ROS cannot be reliably obtained from the patient due to his confusion and 

somnolence


 


MENTAL STATUS EXAMINATION


General Appearance and Behavior: Age appropriate, good hygiene, wearing 

appropriate clothes, staring, uncooperative with questioning.


Cooperation:  Withdrawn


Psychomotor Behavior: unremarkable and within normal limits


Mood: Neutral


Affect and affective range: Flat


Thought Process:N/A


Thought Content: N/A


Speech: no speech production


Intellectual Functioning: N/A


Suicidal Ideation: N/A l


Homicidal Ideation: N/A


Impulse Control: Impaired


Insight and Judgment:  Impaired


Memory: N/A


Attention: Normal,


Orientation: Alert





Diagnoses


Delirium





Treatment Plan


will defer to primary team and recommend neurology evaluation. 


MEDICATIONS: 


Risks, benefits and alternatives of medications discussed with the patient, 

questions answered and consent obtained from patient.


PSYCHOTHERAPY: Supportive psychotherapy provided


MEDICAL: Per primary team


DELIRIUM PRECAUTIONS: Please re-orient patient frequently, keep lights on during

the day, and minimize benzodiazepines and opiates as these medications could 

worsen patient's confusion.


SAFETY SITTER: Defer to primary


DISPOSITION:   Do Not Recommend acute inpatient psychiatric hospitalization at 

this time. Case discussed with Dr. Whitten who agrees with current disposition


FOLLOW-UP: Will sign off


Thank you for the consult. Please contact with any questions and/or concerns.





Mental Status Exam





- Vital signs


                                Last Vital Signs











Temp  98.9 F   04/23/21 08:00


 


Pulse  82   04/23/21 08:00


 


Resp  18   04/23/21 08:00


 


BP  153/61   04/23/21 08:00


 


Pulse Ox  99   04/23/21 08:00

## 2021-04-23 NOTE — PROGRESS NOTE
Assessment and Plan


Assessment and plan: 





#Acute metabolic encephalopathy versus delirium


Patient is alert but nonverbal


Psychiatry evaluation appreciated


MRI brain is negative for acute pathology. EEG performed. Results pending


Doubt NMS as he has no classical signs of fever or muscular rigidity.  Continue 

to hold Haldol.  Hold antipsychotics


Plan to get a CSF analysis to rule out any other infectious pathology.








#CVA ruled out


MRI and CT head negative for CVA





#History of seizures


Continue Keppra


Neurology evaluation appreciated


MRI brain with and without contrast negative for acute pathology


EEG performed - awaiting results. 








#Dehydration


Encourage p.o. intake





# Hypokalemia


Repleted





DVT prophylaxis on heparin and GI prophylaxis














History


Interval history: 


51-year-old male with known history of acute on chronic metabolic 

encephalopathy, depression, speech impairment, seizure disorder, PTSD was 

brought to the emergency room today with altered mental status.  Patient is a 

resident of [facility] nursing [facility]-referred of healthcare.  Nursing staff at 

the facility were concerned that patient may have had a seizure.  He was given 

IM Ativan prior to arrival in the emergency room.





Records indicates the patient was admitted last month and was diagnosed with 

acute on chronic metabolic encephalopathy and also PTSD.


Patient is a poor historian, most of the history was gotten from the ER 

physician.


Patient is said to be on multiple medications at the skilled facility.





Work-up in the emergency room today reveals elevated BUN/creatinine ratio.





Patient is being admitted with altered mental status with possible dehydration.





4/16/21


Patient is alert but not oriented


Just staring at me


In restraints


Staring into space





4/17/2021


Patient is alert but not oriented


Just staring at me


In restraints


Staring into space





4/18


Same condition


ALert but not oriented


Staring into space





Addendum I took extensive history from the wife and reviewed the previous 

admission from March 24 to the discharge time which was approximately March 31, 2021.  Patient was apparently walking and dragging his right lower extremity.  

Not using walker.  But patient has been having persistent expressive aphasia.  

Able to do his ADLs ~admission March 23 on March 31 patient was transferred to 

[facility] for rehab.  MRI was negative for any acute subacute stroke.  

Patient was labeled as TIA when he was discharged from [place] but as per wife 

patient was having right-sided deficits which are more consistent with 

cerebrovascular accident but the imaging studies are not showing any infarct.  

Patient has seizures off and on and is on antiepileptics.  Depakote was 

recommended but not carried through.  Will defer to neurology and psychiatry.  

If cleared by psychiatry and neurology will transfer back to [facility] 

with a regular follow-up with neurology and psychiatry as outpatient.  Patient 

is otherwise stable except for a low potassium which is being supplemented.





4/19


As per psych--no catatonia


Neuro consult appreciated


Ordered MRI agai


Recommended EEG





4/20.


Patient is nonverbal this AM.


He was staring at me.  He subsequently burst into laughter as I tried to listen 

to his chest.


Psychiatry has already evaluated patient


MRI brain with and without contrast and EEG pending


Neurology following





4/21. EEG performed. Awaiting results. Neurology to see. Could not get MRI brain

yesterday. Will reattempt today. Holding his haldol 





4/22. MRI brain is negative for any acute pathology. EEG unofficial read - shows

possible encephalopathy. Awaiting neurology reeval.   Updated patient's wife 

today





4/23.  Neurology recommends lumbar puncture.  Official EEG report is still 

pending.  Updated patient's wife today








Hospitalist Physical





- Physical exam


Narrative exam: 





VITAL SIGNS:  Reviewed.    


GENERAL: Awake


HEAD:  No signs of head trauma.


EYES:  Pupils are equal.  Extraocular motions intact.  


MOUTH:  Oropharynx is normal. 


NECK:  No adenopathy, no JVD.  


CHEST:  Chest with diminished breath sounds bilaterally.  No wheezes, rales, or 

rhonchi.  


CARDIAC:  normal S1 and S2, without murmurs, gallops, or rubs.


ABDOMEN:  Soft, non tender and non distended.  No   rebound or guarding, and no 

masses palpated.   Bowel Sounds normal.


MUSCULOSKELETAL:  No edema


NEUROLOGIC EXAM: Alert but nonverbal.  No new focal neurologic deficits noted.


Psych exam: Nonverbal


SKIN: No obvious lesions








- Constitutional


Vitals: 


                                        











Temp Pulse Resp BP Pulse Ox


 


 98.9 F   82   18   153/61   99 


 


 04/23/21 08:00  04/23/21 08:00  04/23/21 08:00  04/23/21 08:00  04/23/21 08:00














Results





- Labs


CBC & Chem 7: 


                                 04/23/21 04:25





                                 04/23/21 04:25


Labs: 


                             Laboratory Last Values











WBC  9.4 K/mm3 (4.5-11.0)   04/23/21  04:25    


 


RBC  3.71 M/mm3 (3.65-5.03)   04/23/21  04:25    


 


Hgb  12.3 gm/dl (11.8-15.2)   04/23/21  04:25    


 


Hct  35.8 % (35.5-45.6)   04/23/21  04:25    


 


MCV  97 fl (84-94)  H  04/23/21  04:25    


 


MCH  33 pg (28-32)  H  04/23/21  04:25    


 


MCHC  34 % (32-34)   04/23/21  04:25    


 


RDW  13.3 % (13.2-15.2)   04/23/21  04:25    


 


Plt Count  175 K/mm3 (140-440)   04/23/21  04:25    


 


Lymph % (Auto)  18.4 % (13.4-35.0)   04/23/21  04:25    


 


Mono % (Auto)  10.7 % (0.0-7.3)  H  04/23/21  04:25    


 


Eos % (Auto)  0.6 % (0.0-4.3)   04/23/21  04:25    


 


Baso % (Auto)  0.9 % (0.0-1.8)   04/23/21  04:25    


 


Lymph # (Auto)  1.7 K/mm3 (1.2-5.4)   04/23/21  04:25    


 


Mono # (Auto)  1.0 K/mm3 (0.0-0.8)  H  04/23/21  04:25    


 


Eos # (Auto)  0.1 K/mm3 (0.0-0.4)   04/23/21  04:25    


 


Baso # (Auto)  0.1 K/mm3 (0.0-0.1)   04/23/21  04:25    


 


Seg Neutrophils %  69.4 % (40.0-70.0)   04/23/21  04:25    


 


Seg Neutrophils #  6.5 K/mm3 (1.8-7.7)   04/23/21  04:25    


 


PT  14.6 Sec. (12.2-14.9)   04/22/21  19:05    


 


INR  1.16  (0.87-1.13)  H  04/22/21  19:05    


 


APTT  32.4 Sec. (24.2-36.6)   04/22/21  19:05    


 


Sodium  140 mmol/L (137-145)   04/23/21  04:25    


 


Potassium  3.0 mmol/L (3.6-5.0)  L  04/23/21  04:25    


 


Chloride  101.0 mmol/L ()   04/23/21  04:25    


 


Carbon Dioxide  31 mmol/L (22-30)  H  04/23/21  04:25    


 


Anion Gap  11 mmol/L  04/23/21  04:25    


 


BUN  16 mg/dL (9-20)   04/23/21  04:25    


 


Creatinine  0.7 mg/dL (0.8-1.3)  L  04/23/21  04:25    


 


Estimated GFR  > 60 ml/min  04/23/21  04:25    


 


BUN/Creatinine Ratio  23 %  04/23/21  04:25    


 


Glucose  97 mg/dL ()   04/23/21  04:25    


 


POC Glucose  79 mg/dL ()   04/22/21  14:17    


 


Calcium  8.7 mg/dL (8.4-10.2)   04/23/21  04:25    


 


Iron  27 ug/dL ()  L  04/22/21  19:05    


 


TIBC  179 mcg/dL (250-450)  L  04/22/21  19:05    


 


% Saturation  15.08 %  04/22/21  19:05    


 


Transferrin  157 mg/dl (180-329)  L  04/22/21  19:05    


 


Total Bilirubin  0.90 mg/dL (0.1-1.2)   04/23/21  04:25    


 


AST  29 units/L (5-40)   04/23/21  04:25    


 


ALT  14 units/L (7-56)   04/23/21  04:25    


 


Alkaline Phosphatase  88 units/L ()   04/23/21  04:25    


 


Total Creatine Kinase  194 units/L ()  H  04/22/21  19:05    


 


C-Reactive Protein  1.80 mg/dL (0.00-1.30)  H  04/22/21  19:05    


 


Total Protein  6.0 g/dL (6.3-8.2)  L  04/23/21  04:25    


 


Albumin  3.4 g/dL (3.9-5)  L  04/23/21  04:25    


 


Albumin/Globulin Ratio  1.3 %  04/23/21  04:25    


 


Procalcitonin  < 0.05 ng/mL (<0.15)   04/22/21  19:05    


 


TSH  0.709 mlU/mL (0.270-4.200)   04/15/21  18:13    


 


Urine Color  Yellow  (Yellow)   04/15/21  21:28    


 


Urine Turbidity  Clear  (Clear)   04/15/21  21:28    


 


Urine pH  6.0  (5.0-7.0)   04/15/21  21:28    


 


Ur Specific Gravity  1.026  (1.003-1.030)   04/15/21  21:28    


 


Urine Protein  30 mg/dl mg/dL (Negative)   04/15/21  21:28    


 


Urine Glucose (UA)  Neg mg/dL (Negative)   04/15/21  21:28    


 


Urine Ketones  80 mg/dL (Negative)   04/15/21  21:28    


 


Urine Blood  Neg  (Negative)   04/15/21  21:28    


 


Urine Nitrite  Neg  (Negative)   04/15/21  21:28    


 


Urine Bilirubin  Neg  (Negative)   04/15/21  21:28    


 


Urine Urobilinogen  4.0 mg/dL (<2.0)   04/15/21  21:28    


 


Ur Leukocyte Esterase  Neg  (Negative)   04/15/21  21:28    


 


Urine WBC (Auto)  1.0 /HPF (0.0-6.0)   04/15/21  21:28    


 


Urine RBC (Auto)  < 1.0 /HPF (0.0-6.0)   04/15/21  21:28    


 


U Epithel Cells (Auto)  < 1.0 /HPF (0-13.0)   04/15/21  21:28    


 


Urine Mucus  2+ /HPF  04/15/21  21:28    











Gamble/IV: 


                                        





Voiding Method                   Condom Catheter











Active Medications





- Current Medications


Current Medications: 














Generic Name Dose Route Start Last Admin





  Trade Name Freq  PRN Reason Stop Dose Admin


 


Acetaminophen  650 mg  04/15/21 22:12  04/17/21 09:44





  Acetaminophen 325 Mg Tab  PO   650 mg





  Q4H PRN   Administration





  Pain MILD(1-3)/Fever >100.5/HA  


 


Aspirin  300 mg  04/23/21 10:00 





  Aspirin 300 Mg Rect Supp  CT  





  QDAY FAIZA  


 


Atorvastatin Calcium  40 mg  04/16/21 22:00  04/22/21 22:32





  Atorvastatin 40 Mg Tab  PO   Not Given





  QHS FAIZA  


 


Enoxaparin Sodium  40 mg  04/23/21 22:00 





  Enoxaparin 40 Mg/0.4 Ml Inj  SUB-Q  





  QDAY@2200 [facility]  





  Protocol  


 


Escitalopram Oxalate  10 mg  04/16/21 10:00  04/22/21 09:26





  Escitalopram 10 Mg Tab  PO   Not Given





  DAILY FAIZA  


 


Folic Acid  1 mg  04/16/21 10:00  04/22/21 09:25





  Folic Acid 1 Mg Tab  PO   Not Given





  QDAY [facility]  


 


Hydrochlorothiazide  12.5 mg  04/17/21 13:00  04/22/21 09:26





  Hydrochlorothiazide 12.5 Mg Cap  PO   Not Given





  QDAY [facility]  


 


Levetiracetam 500 mg/ Dextrose  105 mls @ 400 mls/hr  04/22/21 12:30  04/23/21 

09:24





  IV   400 mls/hr





  Q12HR FAIZA   Administration


 


Dextrose/Sodium Chloride  1,000 mls @ 75 mls/hr  04/22/21 15:00  04/22/21 14:30





  D5/0.45ns  IV   75 mls/hr





  AS DIRECT FAIZA   Administration


 


Lisinopril  10 mg  04/17/21 13:00  04/22/21 09:28





  Lisinopril 10 Mg Tab  PO   Not Given





  QDAY [facility]  


 


Lorazepam  1 mg  04/17/21 21:37  04/22/21 22:23





  Lorazepam 2 Mg/Ml Vial  IV   1 mg





  Q4H PRN   Administration





  Seizures  


 


Lorazepam  2 mg  04/22/21 12:00  04/23/21 10:20





  Lorazepam 2 Mg/Ml Vial  IV   2 mg





  Q4H PRN   Administration





  Agitation  


 


Magnesium Hydroxide  30 ml  04/15/21 23:03 





  Magnesium Hydroxide (Mom) Oral Liqd Udc  PO  





  Q4H PRN  





  Constipation  


 


Morphine Sulfate  2 mg  04/15/21 23:03  04/17/21 15:55





  Morphine 2 Mg/1 Ml Inj  IV   2 mg





  Q4H PRN   Administration





  Pain, Moderate (4-6)  


 


Multivitamins  1 each  04/16/21 10:00  04/22/21 09:27





  Multivitamins ,Therapeutic Tab  PO   Not Given





  QDAY [facility]  


 


Ondansetron HCl  4 mg  04/15/21 22:12 





  Ondansetron 4 Mg/2 Ml Inj  IV  





  Q8H PRN  





  Nausea And Vomiting  


 


Sodium Chloride  10 ml  04/16/21 10:00  04/22/21 22:24





  Sodium Chloride 0.9% 10 Ml Flush Syringe  IV   10 ml





  BID FAIZA   Administration


 


Sodium Chloride  10 ml  04/15/21 22:12  04/19/21 21:31





  Sodium Chloride 0.9% 10 Ml Flush Syringe  IV   10 ml





  PRN PRN   Administration





  LINE FLUSH  


 


Thiamine HCl  100 mg  04/16/21 10:00  04/22/21 09:27





  Thiamine 100 Mg Tab  PO   Not Given





  QDAY FAIZA  














Nutrition/Malnutrition Assess





- Dietary Evaluation


Nutrition/Malnutrition Findings: 


                                        





Nutrition Notes                                            Start:  04/19/21 

10:12


Freq:                                                      Status: Active       




Protocol:                                                                       




 Document     04/21/21 11:04  CW  (Rec: 04/21/21 11:13  CW  [id number])


 Nutrition Notes


     Initial or Follow up                        Reassessment


     Current Diagnosis                           Stroke


     Other Pertinent Diagnosis                   AMS, seizure


     Current Diet                                Pureed, cardiac


     Labs/Tests                                  K 3.5


     Pertinent Medications                       remeron


                                                 NS at 125 ml/hr


     Height                                      5 ft 8 in


     Weight                                      70.8 kg


     Ideal Body Weight (kg)                      70.00


     BMI                                         23.7


     Weight change and time frame                -3.6% x 7 days; weight has


                                                 tendency to fluctuate around


                                                 this weight


     Weight Status                               Appropriate


     Subjective/Other Information                F/U for intakes. Pt remains


                                                 nonverbal. Per RN and tech, PO


                                                 intake today is neglible. Pt


                                                 recieves feeding assistance.


                                                 Recommend continued encourage


                                                 during meals. Alternate


                                                 feeding route may be necessary


                                                 d/t poor PO intake x5 days.


     Percent of energy/protein needs met:        0%/0%


     Burn                                        Absent


     Trauma                                      Absent


     Current % PO                                Negligible


     Minimum of two criteria                     No


     Energy Intake (severe)                      < or equal to 50% Estimated


                                                 Energy Requirement > or equal


                                                 to 5 days


     #1


      Nutrition Diagnosis                        Inadequate oral intake


      Diagnosis Progress(for reassessment        Continues


       documentation)                            


     Is Patient Ambulatory and/or Out of Bed     No


     REE-([facility]-confined to bed)      9062.381


     Calculation Used for Recommendations        [facility]


     Additional Notes                            Protein: (0.8-1g/kg) 59-74g


                                                 Fluid: 1 ml/kcal


 Nutrition Intervention


     Change Diet Order:                          Continue


     Add Supplement/Snack (indicate name/kcal    Ensure Enlive daily


      /protein )                                 


     Provides kCal:                              350


     Provides Protein (gm)                       20


     Goal #1                                     Meet at least 75% of protein


                                                 and energy needs via PO and


                                                 ONS intakes


     Anticipated Discharge Needs:                Pureed with ONS PRN


     Follow-Up By:                               04/23/21


     Additional Comments                         F/U for intakes, ONS tolerance


                                                 , and POC

## 2021-04-23 NOTE — FLUOROSCOPY REPORT
Fluoroscopic-guided lumbar puncture



INDICATION: Encephalopathy



FINDINGS: Before beginning procedure I personally called Ms. Escobar and obtained informed consent. 
Risk and benefits were explained. The patient was prepped and draped in sterile fashion. Subcutaneous
 lidocaine was utilized overlying the lower back at L2-L3. Subcutaneous lidocaine was used. A 22-gaug
e spinal needle was used for access CSF. 10 cc of CSF which was clear was collected. 4 separate tubes
 were sent to lab.



IMPRESSION:

Successful lumbar puncture without complication.



Signer Name: Tomer Lr MD 

Signed: 4/23/2021 11:58 AM

Workstation Name: [id number]

## 2021-04-24 VITALS — DIASTOLIC BLOOD PRESSURE: 75 MMHG | SYSTOLIC BLOOD PRESSURE: 137 MMHG

## 2021-04-24 LAB
ALBUMIN SERPL-MCNC: 3.4 G/DL (ref 3.9–5)
ALT SERPL-CCNC: 14 UNITS/L (ref 7–56)
BASOPHILS # (AUTO): 0 K/MM3 (ref 0–0.1)
BASOPHILS NFR BLD AUTO: 0.3 % (ref 0–1.8)
BUN SERPL-MCNC: 8 MG/DL (ref 9–20)
BUN/CREAT SERPL: 11 %
CALCIUM SERPL-MCNC: 8.9 MG/DL (ref 8.4–10.2)
EOSINOPHIL # BLD AUTO: 0 K/MM3 (ref 0–0.4)
EOSINOPHIL NFR BLD AUTO: 0 % (ref 0–4.3)
HCT VFR BLD CALC: 36.7 % (ref 35.5–45.6)
HEMOLYSIS INDEX: 0
HGB BLD-MCNC: 12.6 GM/DL (ref 11.8–15.2)
LYMPHOCYTES # BLD AUTO: 1.3 K/MM3 (ref 1.2–5.4)
LYMPHOCYTES NFR BLD AUTO: 8.8 % (ref 13.4–35)
MCHC RBC AUTO-ENTMCNC: 34 % (ref 32–34)
MCV RBC AUTO: 96 FL (ref 84–94)
MONOCYTES # (AUTO): 1.2 K/MM3 (ref 0–0.8)
MONOCYTES % (AUTO): 8 % (ref 0–7.3)
PLATELET # BLD: 190 K/MM3 (ref 140–440)
RBC # BLD AUTO: 3.81 M/MM3 (ref 3.65–5.03)

## 2021-04-24 PROCEDURE — 009U3ZX DRAINAGE OF SPINAL CANAL, PERCUTANEOUS APPROACH, DIAGNOSTIC: ICD-10-PCS

## 2021-04-24 RX ADMIN — LISINOPRIL SCH MG: 10 TABLET ORAL at 10:35

## 2021-04-24 RX ADMIN — ASPIRIN SCH: 300 SUPPOSITORY RECTAL at 10:38

## 2021-04-24 RX ADMIN — Medication SCH MG: at 10:36

## 2021-04-24 RX ADMIN — FOLIC ACID SCH MG: 1 TABLET ORAL at 10:35

## 2021-04-24 RX ADMIN — MULTIVITAMIN TABLET SCH EACH: TABLET at 10:37

## 2021-04-24 RX ADMIN — ESCITALOPRAM OXALATE SCH MG: 10 TABLET ORAL at 10:36

## 2021-04-24 RX ADMIN — Medication SCH ML: at 10:37

## 2021-04-24 RX ADMIN — DEXTROSE AND SODIUM CHLORIDE SCH MLS/HR: 5; .45 INJECTION, SOLUTION INTRAVENOUS at 10:38

## 2021-04-24 RX ADMIN — LEVETIRACETAM SCH: 500 SOLUTION ORAL at 09:27

## 2021-04-24 RX ADMIN — LEVETIRACETAM SCH MLS/HR: 100 INJECTION, SOLUTION INTRAVENOUS at 10:38

## 2021-04-24 NOTE — DISCHARGE SUMMARY
Providers





- Providers


Date of Admission: 


04/16/21 15:00





Date of discharge: 04/24/21


Attending physician: 


PAPI AMADOR





                                        





04/15/21 23:03


Consult to Physician [CONS] Routine 


   Comment: 


   Consulting Provider: MARY LIU


   Physician Instructions: 


   Reason For Exam: Encephalopathy. H/O Seizures





04/16/21 14:48


Physical Therapy Evaluation and Treat [CONS] Routine 


   Comment: HOLDEN placement


   Reason For Exam: Debility





04/16/21 14:50


Occupational Therapy Evaluate and Treat [CONS] Routine 


   Comment: HOLDEN placement


   Reason For Exam: Debility





04/17/21 12:38


Consult to Physician [CONS] Routine 


   Comment: 


   Consulting Provider: KAYLA TONEY


   Physician Instructions: 


   Reason For Exam: Possible catatonic state





04/17/21 12:39


Consult to Mental Health [CONS] Routine 


   Reason For Exam: Possible catatonic state





04/19/21 11:24


Consult to Physician [CONS] Routine 


   Comment: 


   Consulting Provider: HIEN WILSON III


   Physician Instructions: 


   Reason For Exam: CVA





04/22/21 10:53


Consult to Physician [CONS] Routine 


   Comment: 


   Consulting Provider: MARY LIU


   Physician Instructions: 


   Reason For Exam: Reevaluation. EEG performed





04/24/21 11:27


Consult to Dietitian/Nutrition [CONS] Routine 


   Physician Instructions: 


   Reason For Exam: 


   Reason for Consult: Write/Manage Tube Feeding











Primary care physician: 


SAUL RABAGO








Hospitalization


Condition: Stable


Hospital course: 





51-year-old male with known history of acute on chronic metabolic 

encephalopathy, depression, speech impairment, seizure disorder, PTSD was 

brought to the emergency room today with altered mental status.  Patient is a 

resident of [facility] nursing [facility].  Nursing staff at 

the facility were concerned that patient may have had a seizure.  He was given 

IM Ativan prior to arrival in the emergency room.





Records indicates the patient was admitted last month and was diagnosed with 

acute on chronic metabolic encephalopathy and also PTSD.


Patient is a poor historian, most of the history was gotten from the ER 

physician.


Patient is said to be on multiple medications at the skilled facility.





Work-up in the emergency room today reveals elevated BUN/creatinine ratio.





Patient is being admitted with altered mental status with possible dehydration.





4/16/21


Patient is alert but not oriented. Just staring at me. In restraints. Staring 

into space





4/17/2021


Patient is alert but not oriented. Just staring at me. In restraints. Staring 

into space





4/18


Same condition. ALert but not oriented. Staring into space





Addendum I took extensive history from the wife and reviewed the previous 

admission from March 24 to the discharge time which was approximately March 31, 2021.  Patient was apparently walking and dragging his right lower extremity.  

Not using walker.  But patient has been having persistent expressive aphasia.  

Able to do his ADLs ~admission March 23 on March 31 patient was transferred to 

[facility] for rehab.  MRI was negative for any acute subacute stroke.  

Patient was labeled as TIA when he was discharged from [place] but as per wife 

patient was having right-sided deficits which are more consistent with 

cerebrovascular accident but the imaging studies are not showing any infarct.  

Patient has seizures off and on and is on antiepileptics.  Depakote was 

recommended but not carried through.  Will defer to neurology and psychiatry.  

If cleared by psychiatry and neurology will transfer back to [facility] 

with a regular follow-up with neurology and psychiatry as outpatient.  Patient 

is otherwise stable except for a low potassium which is being supplemented.





4/19. As per psych--no catatonia. Neuro consult appreciated. Ordered MRI brain. 

Recommended EEG





4/20. Patient is still nonverbal this AM. He was staring at me.  He subsequently

 burst into laughter as I tried to listen to his chest. Psychiatry has already 

evaluated patient and no additional input from team and so signed off.  MRI 

brain with and without contrast and EEG pending. Neurology following





4/21. EEG performed. Awaiting results. Neurology to see. Could not get MRI brain

 yesterday. Will reattempt today. Holding his haldol 





4/22. MRI brain is negative for any acute pathology. EEG unofficial read - shows

 possible encephalopathy. Awaiting neurology reeval.   Updated patient's wife 

today





4/23.  Neurology recommends lumbar puncture.  Official EEG report is still 

pending.  Updated patient's wife today





4/24.  Official EEG report shows no seizures but encephalopathy.  Discussed case

 with neurology.  Advised CSF analysis.  Results does not show any infectious 

etiology at this time.  Saw and examined patient at bedside this morning.  

Patient is still awake but stares.  Remains aphasic. Gave a trial of dantrolene 

per neurology recs but no effect. Doubt NMS.  Due to persistent symptoms and 

negative results, will put a call to another facility - [place] or [facility] [place] 

complete neurology evaluation as no neurology available here this weekend.


Discussed with [place] neurology - only gave recommendations- try thiamine. 

Ordered thiamine 500mg q8hr x2 day. 


Called [facility] and awaiting response. 





Patient has not eaten in days. Reviewed labs today. WBC bumped to 15. Chest xray

 is negative. Procalcitonin is 0.05. 





Will start him on tube feeds as he is not able to eat. 





I believe patient will benefit from a continuous EEG monitoring at this time due

 to persistent aphasia and mental status which has not not improved. At this 

point, we have no neurology this weekend. Discussed with neurologist and IMS at 

[facility] who have kindly accepted patient for transfer for continuous EEG and 

neurology evaluation. I have updated patients wife who agrees with transfer. His

 COVID -19 test is negative.














Disposition: [id number]/[facility]-[id number] ANOTHER TYPE HLTHCARE


Final Discharge Diagnosis (Prints w/discharge instructions): Aphasia.  Metabolic

 encephalopathy - ?possible seizures


Time spent for discharge: 50 mins





- Discharge Diagnoses


(1) Aphasia


Status: Acute   





(2) Seizure


Status: Acute   





Core Measure Documentation





- Palliative Care


Palliative Care/ Comfort Measures: Not Applicable





- Core Measures


Any of the following diagnoses?: none





Exam





- Physical Exam


Narrative exam: 





VITAL SIGNS:  Reviewed.    


GENERAL: Awake


HEAD:  No signs of head trauma.


EYES:  Pupils are equal.  Extraocular motions intact.  


MOUTH:  Oropharynx is normal. 


NECK:  No adenopathy, no JVD.  


CHEST:  Chest with diminished breath sounds bilaterally.  No wheezes, rales, or 

rhonchi.  


CARDIAC:  normal S1 and S2, without murmurs, gallops, or rubs.


ABDOMEN:  Soft, non tender and non distended.  No   rebound or guarding, and no 

masses palpated.   Bowel Sounds normal.


MUSCULOSKELETAL:  No edema


NEUROLOGIC EXAM: Alert but nonverbal.  No new focal neurologic deficits noted.


Psych exam: Nonverbal


SKIN: No obvious lesions








- Constitutional


Vitals: 


                                        











Temp Pulse Resp BP Pulse Ox


 


 97.4 F L  116 H  18   138/68   94 


 


 04/24/21 08:34  04/24/21 15:27  04/24/21 08:34  04/24/21 15:27  04/24/21 15:27














Plan


Follow up with: 


SAUL RABAGO MD [Primary Care Provider] - 3-5 Days

## 2021-04-24 NOTE — PROGRESS NOTE
Assessment and Plan


Assessment and plan: 





#Acute metabolic encephalopathy versus delirium


Patient is nonverbal


Psychiatry evaluation appreciated


MRI brain is negative for acute pathology. EEG  - severe encephalopathy-  no 

seizures. 


Doubt NMS as he has no classical signs of fever or muscular rigidity.  Continue 

to hold Haldol.  Hold antipsychotics


Plan to get a CSF analysis to rule out any other infectious pathology. Results 

negative for any intracranial infection 








#CVA ruled out


MRI and CT head negative for CVA








#History of seizures


Continue Kera


Neurology evaluation appreciated


MRI brain with and without contrast negative for acute pathology


EEG performed -No seizure. +encephalopathy








#Dehydration


Patient is not eating. 


Will place NG tube for feeding





# Hypokalemia


Repleted





#Leukocytosis


Need to ensure he is not aspirating


Will get xr chest. UA already performed at admission





DVT prophylaxis on heparin and GI prophylaxis














History


Interval history: 


51-year-old male with known history of acute on chronic metabolic 

encephalopathy, depression, speech impairment, seizure disorder, PTSD was 

brought to the emergency room today with altered mental status.  Patient is a 

resident of [facility] nursing [facility]-referred of healthcare.  Nursing staff at 

the facility were concerned that patient may have had a seizure.  He was given 

IM Ativan prior to arrival in the emergency room.





Records indicates the patient was admitted last month and was diagnosed with 

acute on chronic metabolic encephalopathy and also PTSD.


Patient is a poor historian, most of the history was gotten from the ER 

physician.


Patient is said to be on multiple medications at the skilled facility.





Work-up in the emergency room today reveals elevated BUN/creatinine ratio.





Patient is being admitted with altered mental status with possible dehydration.





4/16/21


Patient is alert but not oriented


Just staring at me


In restraints


Staring into space





4/17/2021


Patient is alert but not oriented


Just staring at me


In restraints


Staring into space





4/18


Same condition


ALert but not oriented


Staring into space





Addendum I took extensive history from the wife and reviewed the previous 

admission from March 24 to the discharge time which was approximately March 31, 2021.  Patient was apparently walking and dragging his right lower extremity.  

Not using walker.  But patient has been having persistent expressive aphasia.  

Able to do his ADLs ~admission March 23 on March 31 patient was transferred to 

[facility] for rehab.  MRI was negative for any acute subacute stroke.  

Patient was labeled as TIA when he was discharged from [facility] but as per wife 

patient was having right-sided deficits which are more consistent with 

cerebrovascular accident but the imaging studies are not showing any infarct.  

Patient has seizures off and on and is on antiepileptics.  Depakote was 

recommended but not carried through.  Will defer to neurology and psychiatry.  

If cleared by psychiatry and neurology will transfer back to [facility] 

with a regular follow-up with neurology and psychiatry as outpatient.  Patient i

s otherwise stable except for a low potassium which is being supplemented.





4/19


As per psych--no catatonia


Neuro consult appreciated


[facility]


Recommended EEG





4/20.


Patient is nonverbal this AM.


He was staring at me.  He subsequently burst into laughter as I tried to listen 

to his chest.


Psychiatry has already evaluated patient


MRI brain with and without contrast and EEG pending


Neurology following





4/21. EEG performed. Awaiting results. Neurology to see. Could not get MRI brain

yesterday. Will reattempt today. Holding his haldol 





4/22. MRI brain is negative for any acute pathology. EEG unofficial read - shows

possible encephalopathy. Awaiting neurology reeval.   Updated patient's wife 

today





4/23.  Neurology recommends lumbar puncture.  Official EEG report is still 

pending.  Updated patient's wife today





4/24.  Official EEG report shows no seizures but encephalopathy.  Discussed case

with neurology.  Advised CSF analysis.  Results does not show any infectious 

etiology at this time.  Saw and examined patient at bedside this morning.  

Patient is still awake but stares.  Remains aphasic. Gave a trial of dantrolene 

per neurology recs but no effect. Doubt NMS.  Due to persistent symptoms and 

negative results, will put a call to another facility - [facility] for 

complete neurology evaluation as no neurology available here this weekend.


Discussed with [facility] neurology - only gave recommendations- try thiamine. 

Ordered thiamine 500mg q8hr x2 day. 


Called [facility] and awaiting response. 





Patient has not eaten in days. Reviewed labs today. WBC bumped to 15. Chest xray

is negative. Procalcitonin is 0.05. 





Will start him on tube feeds as he is not able to eat. 





I believe patient will benefit from a continuous EEG monitoring at this time due

to persistent aphasia and mental status which has not not improved. At this 

point, we have no neurology this weekend. Discussed with neurologist and IMS at 

[facility] who have kindly accepted patient for transfer for continuous EEG and 

neurology evaluation. I have updated patients wife who agrees with transfer. His

COVID -19 test is negative.








Hospitalist Physical





- Physical exam


Narrative exam: 





VITAL SIGNS:  Reviewed.    


GENERAL: Awake


HEAD:  No signs of head trauma.


EYES:  Pupils are equal.  Extraocular motions intact.  


MOUTH:  Oropharynx is normal. 


NECK:  No adenopathy, no JVD.  


CHEST:  Chest with diminished breath sounds bilaterally.  No wheezes, rales, or 

rhonchi.  


CARDIAC:  normal S1 and S2, without murmurs, gallops, or rubs.


ABDOMEN:  Soft, non tender and non distended.  No   rebound or guarding, and no 

masses palpated.   Bowel Sounds normal.


MUSCULOSKELETAL:  No edema


NEUROLOGIC EXAM: Alert but nonverbal.  No new focal neurologic deficits noted.


Psych exam: Nonverbal


SKIN: No obvious lesions








- Constitutional


Vitals: 


                                        











Temp Pulse Resp BP Pulse Ox


 


 97.4 F L  103 H  18   141/89   98 


 


 04/24/21 08:34  04/24/21 10:35  04/24/21 08:34  04/24/21 10:35  04/24/21 08:34














Results





- Labs


CBC & Chem 7: 


                                 04/24/21 04:53





                                 04/24/21 04:53


Labs: 


                             Laboratory Last Values











WBC  15.0 K/mm3 (4.5-11.0)  H  04/24/21  04:53    


 


RBC  3.81 M/mm3 (3.65-5.03)   04/24/21  04:53    


 


Hgb  12.6 gm/dl (11.8-15.2)   04/24/21  04:53    


 


Hct  36.7 % (35.5-45.6)   04/24/21  04:53    


 


MCV  96 fl (84-94)  H  04/24/21  04:53    


 


MCH  33 pg (28-32)  H  04/24/21  04:53    


 


MCHC  34 % (32-34)   04/24/21  04:53    


 


RDW  13.3 % (13.2-15.2)   04/24/21  04:53    


 


Plt Count  190 K/mm3 (140-440)   04/24/21  04:53    


 


Lymph % (Auto)  8.8 % (13.4-35.0)  L  04/24/21  04:53    


 


Mono % (Auto)  8.0 % (0.0-7.3)  H  04/24/21  04:53    


 


Eos % (Auto)  0.0 % (0.0-4.3)   04/24/21  04:53    


 


Baso % (Auto)  0.3 % (0.0-1.8)   04/24/21  04:53    


 


Lymph # (Auto)  1.3 K/mm3 (1.2-5.4)   04/24/21  04:53    


 


Mono # (Auto)  1.2 K/mm3 (0.0-0.8)  H  04/24/21  04:53    


 


Eos # (Auto)  0.0 K/mm3 (0.0-0.4)   04/24/21  04:53    


 


Baso # (Auto)  0.0 K/mm3 (0.0-0.1)   04/24/21  04:53    


 


Seg Neutrophils %  82.9 % (40.0-70.0)  H  04/24/21  04:53    


 


Seg Neutrophils #  12.5 K/mm3 (1.8-7.7)  H  04/24/21  04:53    


 


PT  14.6 Sec. (12.2-14.9)   04/22/21  19:05    


 


INR  1.16  (0.87-1.13)  H  04/22/21  19:05    


 


APTT  32.4 Sec. (24.2-36.6)   04/22/21  19:05    


 


Sodium  137 mmol/L (137-145)   04/24/21  04:53    


 


Potassium  3.5 mmol/L (3.6-5.0)  L  04/24/21  04:53    


 


Chloride  100.5 mmol/L ()   04/24/21  04:53    


 


Carbon Dioxide  28 mmol/L (22-30)   04/24/21  04:53    


 


Anion Gap  12 mmol/L  04/24/21  04:53    


 


BUN  8 mg/dL (9-20)  L  04/24/21  04:53    


 


Creatinine  0.7 mg/dL (0.8-1.3)  L  04/24/21  04:53    


 


Estimated GFR  > 60 ml/min  04/24/21  04:53    


 


BUN/Creatinine Ratio  11 %  04/24/21  04:53    


 


Glucose  100 mg/dL ()   04/24/21  04:53    


 


POC Glucose  84 mg/dL ()   04/23/21  17:18    


 


Calcium  8.9 mg/dL (8.4-10.2)   04/24/21  04:53    


 


Iron  27 ug/dL ()  L  04/22/21  19:05    


 


TIBC  179 mcg/dL (250-450)  L  04/22/21  19:05    


 


% Saturation  15.08 %  04/22/21  19:05    


 


Transferrin  157 mg/dl (180-329)  L  04/22/21  19:05    


 


Total Bilirubin  1.30 mg/dL (0.1-1.2)  H  04/24/21  04:53    


 


AST  30 units/L (5-40)   04/24/21  04:53    


 


ALT  14 units/L (7-56)   04/24/21  04:53    


 


Alkaline Phosphatase  97 units/L ()   04/24/21  04:53    


 


Total Creatine Kinase  194 units/L ()  H  04/22/21  19:05    


 


C-Reactive Protein  1.80 mg/dL (0.00-1.30)  H  04/22/21  19:05    


 


Total Protein  6.3 g/dL (6.3-8.2)   04/24/21  04:53    


 


Albumin  3.4 g/dL (3.9-5)  L  04/24/21  04:53    


 


Albumin/Globulin Ratio  1.2 %  04/24/21  04:53    


 


Procalcitonin  < 0.05 ng/mL (<0.15)   04/22/21  19:05    


 


TSH  0.709 mlU/mL (0.270-4.200)   04/15/21  18:13    


 


Urine Color  Yellow  (Yellow)   04/15/21  21:28    


 


Urine Turbidity  Clear  (Clear)   04/15/21  21:28    


 


Urine pH  6.0  (5.0-7.0)   04/15/21  21:28    


 


Ur Specific Gravity  1.026  (1.003-1.030)   04/15/21  21:28    


 


Urine Protein  30 mg/dl mg/dL (Negative)   04/15/21  21:28    


 


Urine Glucose (UA)  Neg mg/dL (Negative)   04/15/21  21:28    


 


Urine Ketones  80 mg/dL (Negative)   04/15/21  21:28    


 


Urine Blood  Neg  (Negative)   04/15/21  21:28    


 


Urine Nitrite  Neg  (Negative)   04/15/21  21:28    


 


Urine Bilirubin  Neg  (Negative)   04/15/21  21:28    


 


Urine Urobilinogen  4.0 mg/dL (<2.0)   04/15/21  21:28    


 


Ur Leukocyte Esterase  Neg  (Negative)   04/15/21  21:28    


 


Urine WBC (Auto)  1.0 /HPF (0.0-6.0)   04/15/21  21:28    


 


Urine RBC (Auto)  < 1.0 /HPF (0.0-6.0)   04/15/21  21:28    


 


U Epithel Cells (Auto)  < 1.0 /HPF (0-13.0)   04/15/21  21:28    


 


Urine Mucus  2+ /HPF  04/15/21  21:28    


 


CSF Appearance  Clear   04/23/21  11:15    


 


CSF Color  Colorless   04/23/21  11:15    


 


CSF WBC  2 /mm3 (1-10)   04/23/21  11:15    


 


CSF RBC  1 /mm3 (0-0)   04/23/21  11:15    


 


CSF Seg Neutrophils  Not Reportable   04/23/21  11:15    


 


CSF Lymphocytes %  51.6 % (40-80)   04/23/21  11:15    


 


CSF Reactive Lymphs  Not Reportable   04/23/21  11:15    


 


CSF Monocytes %  48.4 % (15-45)   04/23/21  11:15    


 


CSF Eosinophils %  Not Reportable   04/23/21  11:15    


 


CSF Basophils  Not Reportable   04/23/21  11:15    


 


CSF Pathologist Review  C   04/23/21  11:15    


 


CSF Glucose  71 mg/dL  04/23/21  11:15    


 


CSF Total Protein  41 mg/dL  04/23/21  11:15    











Microbiology: 


Microbiology





04/23/21 11:15   Cerebral Spinal Fluid   CSF Culture - Preliminary








Gamble/IV: 


                                        





Voiding Method                   Condom Catheter











Active Medications





- Current Medications


Current Medications: 














Generic Name Dose Route Start Last Admin





  Trade Name Freq  PRN Reason Stop Dose Admin


 


Acetaminophen  650 mg  04/15/21 22:12  04/17/21 09:44





  Acetaminophen 325 Mg Tab  PO   650 mg





  Q4H PRN   Administration





  Pain MILD(1-3)/Fever >100.5/HA  


 


Aspirin  300 mg  04/23/21 10:00  04/24/21 10:38





  Aspirin 300 Mg Rect Supp  MI   Not Given





  QDAY FAIZA  


 


Atorvastatin Calcium  40 mg  04/16/21 22:00  04/23/21 22:52





  Atorvastatin 40 Mg Tab  PO   40 mg





  QHS FAIZA   Administration


 


Dextrose  25 ml  04/23/21 14:00  04/23/21 14:05





  Dextrose 50% In Water (25gm) 50 Ml Syringe  IV   25 ml





  Q30MIN PRN   Administration





  Hypoglycemia  





  Protocol  


 


Enoxaparin Sodium  40 mg  04/23/21 22:00  04/23/21 22:52





  Enoxaparin 40 Mg/0.4 Ml Inj  SUB-Q   40 mg





  QDAY@2200 FAIZA   Administration





  Protocol  


 


Escitalopram Oxalate  10 mg  04/16/21 10:00  04/24/21 10:36





  Escitalopram 10 Mg Tab  PO   10 mg





  DAILY FAIZA   Administration


 


Folic Acid  1 mg  04/16/21 10:00  04/24/21 10:35





  Folic Acid 1 Mg Tab  PO   1 mg





  QDAY FAIZA   Administration


 


Hydrochlorothiazide  12.5 mg  04/17/21 13:00  04/24/21 10:35





  Hydrochlorothiazide 12.5 Mg Cap  PO   12.5 mg





  QDAY FAIZA   Administration


 


Levetiracetam 500 mg/ Dextrose  105 mls @ 400 mls/hr  04/22/21 12:30  04/24/21 

10:38





  IV   400 mls/hr





  Q12HR FAIZA   Administration


 


Dextrose/Sodium Chloride  1,000 mls @ 75 mls/hr  04/22/21 15:00  04/24/21 10:38





  D5/0.45ns  IV   75 mls/hr





  AS DIRECT FAIZA   Administration


 


Lisinopril  10 mg  04/17/21 13:00  04/24/21 10:35





  Lisinopril 10 Mg Tab  PO   10 mg





  QDAY FAIZA   Administration


 


Lorazepam  1 mg  04/17/21 21:37  04/22/21 22:23





  Lorazepam 2 Mg/Ml Vial  IV   1 mg





  Q4H PRN   Administration





  Seizures  


 


Lorazepam  2 mg  04/22/21 12:00  04/23/21 10:20





  Lorazepam 2 Mg/Ml Vial  IV   2 mg





  Q4H PRN   Administration





  Agitation  


 


Magnesium Hydroxide  30 ml  04/15/21 23:03  04/24/21 10:37





  Magnesium Hydroxide (Mom) Oral Liqd Udc  PO   30 ml





  Q4H PRN   Administration





  Constipation  


 


Morphine Sulfate  2 mg  04/15/21 23:03  04/17/21 15:55





  Morphine 2 Mg/1 Ml Inj  IV   2 mg





  Q4H PRN   Administration





  Pain, Moderate (4-6)  


 


Multivitamins  1 each  04/16/21 10:00  04/24/21 10:37





  Multivitamins ,Therapeutic Tab  PO   1 each





  QDAY FAIZA   Administration


 


Ondansetron HCl  4 mg  04/15/21 22:12 





  Ondansetron 4 Mg/2 Ml Inj  IV  





  Q8H PRN  





  Nausea And Vomiting  


 


Sodium Chloride  10 ml  04/16/21 10:00  04/24/21 10:37





  Sodium Chloride 0.9% 10 Ml Flush Syringe  IV   10 ml





  BID FAIZA   Administration


 


Sodium Chloride  10 ml  04/15/21 22:12  04/19/21 21:31





  Sodium Chloride 0.9% 10 Ml Flush Syringe  IV   10 ml





  PRN PRN   Administration





  LINE FLUSH  


 


Thiamine HCl  100 mg  04/16/21 10:00  04/24/21 10:36





  Thiamine 100 Mg Tab  PO   100 mg





  QDAY FAIZA   Administration














Nutrition/Malnutrition Assess





- Dietary Evaluation


Nutrition/Malnutrition Findings: 


                                        





Nutrition Notes                                            Start:  04/19/21 

10:12


Freq:                                                      Status: Active       




Protocol:                                                                       




 Document     04/23/21 11:36    (Rec: 04/23/21 11:41    [id number])


 Nutrition Notes


     Initial or Follow up                        Reassessment


     Current Diagnosis                           Stroke


     Other Pertinent Diagnosis                   AMS, seizure


     Current Diet                                Pureed, cardiac


     Labs/Tests                                  K 3


     Pertinent Medications                       D5 1/2 NS at 75 ml/hr


     Height                                      5 ft 8 in


     Weight                                      69.9 kg


     Ideal Body Weight (kg)                      70.00


     BMI                                         23.4


     Weight change and time frame                wt loss noted, will follow


                                                 trends


     Weight Status                               Appropriate


     Subjective/Other Information                FU for intakes. Per RN, pt is


                                                 not eating, drinking or taking


                                                 medications. Tried giving pt


                                                 juice and he refused. Multiple


                                                 ONS in room. Recommend tube


                                                 feeding if pt does not eat


                                                 tomorrow.


     Percent of energy/protein needs met:        0%/0%


     Burn                                        Absent


     Trauma                                      Absent


     Current % PO                                Negligible


     Minimum of two criteria                     No


     Energy Intake (severe)                      < or equal to 50% Estimated


                                                 Energy Requirement > or equal


                                                 to 5 days


     #1


      Nutrition Diagnosis                        Inadequate oral intake


      Diagnosis Progress(for reassessment        Continues


       documentation)                            


     Is patient on ventilator?                   No


     Is Patient Ambulatory and/or Out of Bed     No


     REE-([facility]-confined to bed)      2502.160


     Calculation Used for Recommendations        [facility]


     Additional Notes                            Protein: (0.8-1g/kg) 59-74g


                                                 Fluid: 1 ml/kcal


 Nutrition Intervention


     Change Diet Order:                          Continue


     Add Supplement/Snack (indicate name/kcal    D/C


      /protein )                                 


     Goal #1                                     Meet at least 75% of protein


                                                 and energy needs via PO and


                                                 ONS intakes


     Anticipated Discharge Needs:                Unable to determine at this


                                                 time


     Follow-Up By:                               04/26/21


     Additional Comments                         FU for intakes and plan of


                                                 care

## 2021-04-24 NOTE — XRAY REPORT
CHEST 1 VIEW 4/24/2021 10:37 AM



INDICATION / CLINICAL INFORMATION: leukocytosis.



COMPARISON: 03/23/21



FINDINGS:



SUPPORT DEVICES: None.

HEART / MEDIASTINUM: No significant abnormality. 

LUNGS / PLEURA: No significant pulmonary or pleural abnormality. No pneumothorax. 



ADDITIONAL FINDINGS: No significant additional findings.



IMPRESSION:

1. No acute findings. No change.



Signer Name: RL Hauser MD 

Signed: 4/24/2021 11:42 AM

Workstation Name: [email or web address]